# Patient Record
Sex: MALE | Race: WHITE | NOT HISPANIC OR LATINO | Employment: FULL TIME | URBAN - METROPOLITAN AREA
[De-identification: names, ages, dates, MRNs, and addresses within clinical notes are randomized per-mention and may not be internally consistent; named-entity substitution may affect disease eponyms.]

---

## 2017-02-23 ENCOUNTER — TELEPHONE (OUTPATIENT)
Dept: FAMILY MEDICINE | Facility: CLINIC | Age: 22
End: 2017-02-23

## 2017-02-23 NOTE — TELEPHONE ENCOUNTER
Reason for Call:  Other     Detailed comments: Patient would like to discuss if he is up to date on immunizations and where to go for a malaria pill.     Phone Number Patient can be reached at: Home number on file [phone] (home)    Best Time:     Can we leave a detailed message on this number?     Call taken on 2/23/2017 at 9:15 AM by Cari Albrecht

## 2017-02-23 NOTE — TELEPHONE ENCOUNTER
"Patient should make appt with travel clinic for this:    [facility]  [address]  [place] [address] [phone]      Attempted to call patient at home number, left message on voicemail identified as \"Rosario\" requesting patient call back to clinic to discuss.  Dorita Tinsley RN  [facility]    "

## 2017-02-24 NOTE — TELEPHONE ENCOUNTER
Walked up and relayed message to Rosario, mother.  She verbalized understanding.  Patient is currently in [place] and will seek out a travel clinic there.    Leonarda Marie RN  [facility]

## 2017-07-31 ENCOUNTER — OFFICE VISIT (OUTPATIENT)
Dept: URGENT CARE | Facility: URGENT CARE | Age: 22
End: 2017-07-31
Payer: COMMERCIAL

## 2017-07-31 VITALS
WEIGHT: 205.2 LBS | BODY MASS INDEX: 28.42 KG/M2 | HEART RATE: 85 BPM | OXYGEN SATURATION: 98 % | TEMPERATURE: 98 F | SYSTOLIC BLOOD PRESSURE: 142 MMHG | DIASTOLIC BLOOD PRESSURE: 89 MMHG

## 2017-07-31 DIAGNOSIS — J01.90 ACUTE SINUSITIS WITH COEXISTING CONDITION, NEED PROPHYLACTIC TREATMENT: Primary | ICD-10-CM

## 2017-07-31 DIAGNOSIS — J20.9 ACUTE BRONCHITIS WITH COEXISTING CONDITION REQUIRING PROPHYLACTIC TREATMENT: ICD-10-CM

## 2017-07-31 DIAGNOSIS — R07.0 THROAT PAIN: ICD-10-CM

## 2017-07-31 LAB
DEPRECATED S PYO AG THROAT QL EIA: NORMAL
MICRO REPORT STATUS: NORMAL
SPECIMEN SOURCE: NORMAL

## 2017-07-31 PROCEDURE — 87081 CULTURE SCREEN ONLY: CPT | Performed by: PHYSICIAN ASSISTANT

## 2017-07-31 PROCEDURE — 99213 OFFICE O/P EST LOW 20 MIN: CPT | Performed by: PHYSICIAN ASSISTANT

## 2017-07-31 PROCEDURE — 87880 STREP A ASSAY W/OPTIC: CPT | Performed by: PHYSICIAN ASSISTANT

## 2017-07-31 RX ORDER — AZITHROMYCIN 250 MG/1
TABLET, FILM COATED ORAL
Qty: 6 TABLET | Refills: 0 | Status: SHIPPED | OUTPATIENT
Start: 2017-07-31 | End: 2017-09-20

## 2017-07-31 NOTE — MR AVS SNAPSHOT
After Visit Summary   7/31/2017    Asher Lynn    MRN: [id number]           Patient Information     Date Of Birth          1995        Visit Information        Provider Department      7/31/2017 8:30 PM Rosario Rajan PA-C [facility]        Today's Diagnoses     Throat pain    -  1    Acute sinusitis with coexisting condition, need prophylactic treatment           Follow-ups after your visit        Your next 10 appointments already scheduled     Aug 02, 2017  5:00 PM CDT   Office Visit with Duane Foy MD   [facility]    [address] [place] [address] [phone]           Bring a current list of meds and any records pertaining to this visit. For Physicals, please bring immunization records and any forms needing to be filled out. Please arrive 10 minutes early to complete paperwork.              Who to contact     If you have questions or need follow up information about today's clinic visit or your schedule please contact [facility] directly at [phone].  Normal or non-critical lab and imaging results will be communicated to you by MyChart, letter or phone within 4 business days after the clinic has received the results. If you do not hear from us within 7 days, please contact the clinic through [employer] or phone. If you have a critical or abnormal lab result, we will notify you by phone as soon as possible.  Submit refill requests through [employer] or call your pharmacy and they will forward the refill request to us. Please allow 3 business days for your refill to be completed.          Additional Information About Your Visit        MyChart Information     [employer] gives you secure access to your electronic health record. If you see a primary care provider, you can also send messages to your care team and make appointments. If you have questions,  please call your primary care clinic.  If you do not have a primary care provider, please call [phone] and they will assist you.        Care EveryWhere ID     This is your Care EveryWhere ID. This could be used by other organizations to access your [facility] medical records  [id number]        Your Vitals Were     Pulse Temperature Pulse Oximetry BMI (Body Mass Index)          85 98  F (36.7  C) (Oral) 98% 28.42 kg/m2         Blood Pressure from Last 3 Encounters:   07/31/17 142/89   03/03/16 113/72   04/22/15 115/67    Weight from Last 3 Encounters:   07/31/17 205 lb 3.2 oz (93.1 kg)   03/03/16 192 lb (87.1 kg)   04/22/15 188 lb (85.3 kg)              We Performed the Following     Beta strep group A culture     Strep, Rapid Screen          Today's Medication Changes          These changes are accurate as of: 7/31/17  8:57 PM.  If you have any questions, ask your nurse or doctor.               Start taking these medicines.        Dose/Directions    azithromycin 250 MG tablet   Commonly known as:  ZITHROMAX Z-EILEEN   Used for:  Throat pain, Acute sinusitis with coexisting condition, need prophylactic treatment   Started by:  Rosario Rajan PA-C        2 tabs day one then 1 tab qd   Quantity:  6 tablet   Refills:  0            Where to get your medicines      These medications were sent to [id number] - [place] - [address] AT [address]  [address] [place] [address]     Phone:  [phone]     azithromycin 250 MG tablet                Primary Care Provider Office Phone # Fax #    Duane Foy [phone]       [facility] [address] [address]        Equal Access to Services     NICOLE CARMONA : Osvaldo Salazar, wakellie metzger, qaybta kaekaterina wesley, villa koroma. So [facility] [phone].    ATENCIÓN: Si deannala español, tiene a feng disposición  servicios gratuitos de asistencia lingüística. Socorro crystal [phone].    We comply with applicable federal civil rights laws and [place] laws. We do not discriminate on the basis of race, color, national origin, age, disability sex, sexual orientation or gender identity.            Thank you!     Thank you for choosing [facility]  for your care. Our goal is always to provide you with excellent care. Hearing back from our patients is one way we can continue to improve our services. Please take a few minutes to complete the written survey that you may receive in the mail after your visit with us. Thank you!             Your Updated Medication List - Protect others around you: Learn how to safely use, store and throw away your medicines at www.disposemymeds.org.          This list is accurate as of: 7/31/17  8:57 PM.  Always use your most recent med list.                   Brand Name Dispense Instructions for use Diagnosis    azithromycin 250 MG tablet    ZITHROMAX Z-EILEEN    6 tablet    2 tabs day one then 1 tab qd    Throat pain, Acute sinusitis with coexisting condition, need prophylactic treatment       IBUPROFEN PO      Take 1 tablet by mouth daily.

## 2017-08-01 LAB
BACTERIA SPEC CULT: NORMAL
MICRO REPORT STATUS: NORMAL
SPECIMEN SOURCE: NORMAL

## 2017-08-01 NOTE — PROGRESS NOTES
SUBJECTIVE:                                                    Asher Lynn is a 21 year old male who presents to clinic today for the following health issues:      RESPIRATORY SYMPTOMS      Duration: x1 week    Description  nasal congestion, sore throat, facial pain/pressure, cough, fever and headache    Severity: severe    Accompanying signs and symptoms: None    History (predisposing factors):  none    Precipitating or alleviating factors: None    Therapies tried and outcome:  rest and fluids oral decongestant acetaminophen ibuprofen    H/o asthma. Works in a     Allergies   Allergen Reactions     Morphine Hives       Past Medical History:   Diagnosis Date     Acne      Nephrolithiasis 12/15    Calcium oxalate stones         Current Outpatient Prescriptions on File Prior to Visit:  IBUPROFEN PO Take 1 tablet by mouth daily.     No current facility-administered medications on file prior to visit.     Social History   Substance Use Topics     Smoking status: Never Smoker     Smokeless tobacco: Never Used     Alcohol use 0.0 oz/week     0 Standard drinks or equivalent per week       ROS:  Consitutional: As above  ENT: As above  Respiratory: As above    OBJECTIVE:  /89  Pulse 85  Temp 98  F (36.7  C) (Oral)  Wt 205 lb 3.2 oz (93.1 kg)  SpO2 98%  BMI 28.42 kg/m2  GENERAL APPEARANCE: healthy, alert and no distress  EYES: conjunctiva clear  EARS: No cerumen.   Ear canals w/o erythema, TM's intact w/o erythema.    NOSE/MOUTH: Nose and mouth without ulcers, erythema or lesions  SINUSES: Moderate maxillary sinus tenderness.  THROAT: Moderate erythema w/o tonsillar enlargement . No exudates  NECK: supple, nontender, no lymphadenopathy  RESP: lungs clear to auscultation - no rales, rhonchi or wheezes  CV: regular rates and rhythm, normal S1 S2, no murmur noted  NEURO: awake, alert      Results for orders placed or performed in visit on 07/31/17   Strep, Rapid Screen   Result Value Ref Range     Specimen Description Throat     Rapid Strep A Screen       NEGATIVE: No Group A streptococcal antigen detected by immunoassay, await   culture report.      Micro Report Status FINAL 07/31/2017          ASSESSMENT: Well appear    ICD-10-CM    1. Acute sinusitis with coexisting condition, need prophylactic treatment J01.90 azithromycin (ZITHROMAX Z-EILEEN) 250 MG tablet   2. Acute bronchitis with coexisting condition requiring prophylactic treatment J20.9    3. Throat pain R07.0 Strep, Rapid Screen     Beta strep group A culture     azithromycin (ZITHROMAX Z-EILEEN) 250 MG tablet       PLAN:  Lots of rest and fluids.  RTC if any worsening symptoms or if not improving.    Rosario Rajan PA-C

## 2017-08-02 ENCOUNTER — OFFICE VISIT (OUTPATIENT)
Dept: FAMILY MEDICINE | Facility: CLINIC | Age: 22
End: 2017-08-02
Payer: COMMERCIAL

## 2017-08-02 VITALS
HEART RATE: 72 BPM | SYSTOLIC BLOOD PRESSURE: 131 MMHG | DIASTOLIC BLOOD PRESSURE: 79 MMHG | OXYGEN SATURATION: 98 % | TEMPERATURE: 98.3 F | HEIGHT: 71 IN | WEIGHT: 211 LBS | BODY MASS INDEX: 29.54 KG/M2

## 2017-08-02 DIAGNOSIS — M75.41 ROTATOR CUFF IMPINGEMENT SYNDROME OF RIGHT SHOULDER: Primary | ICD-10-CM

## 2017-08-02 DIAGNOSIS — Z13.1 SCREENING FOR DIABETES MELLITUS: ICD-10-CM

## 2017-08-02 DIAGNOSIS — Z11.3 SCREENING EXAMINATION FOR VENEREAL DISEASE: ICD-10-CM

## 2017-08-02 LAB — HBA1C MFR BLD: 5 % (ref 4.3–6)

## 2017-08-02 PROCEDURE — 87591 N.GONORRHOEAE DNA AMP PROB: CPT | Performed by: FAMILY MEDICINE

## 2017-08-02 PROCEDURE — 87389 HIV-1 AG W/HIV-1&-2 AB AG IA: CPT | Performed by: FAMILY MEDICINE

## 2017-08-02 PROCEDURE — 36415 COLL VENOUS BLD VENIPUNCTURE: CPT | Performed by: FAMILY MEDICINE

## 2017-08-02 PROCEDURE — 87491 CHLMYD TRACH DNA AMP PROBE: CPT | Performed by: FAMILY MEDICINE

## 2017-08-02 PROCEDURE — 83036 HEMOGLOBIN GLYCOSYLATED A1C: CPT | Performed by: FAMILY MEDICINE

## 2017-08-02 PROCEDURE — 82947 ASSAY GLUCOSE BLOOD QUANT: CPT | Performed by: FAMILY MEDICINE

## 2017-08-02 PROCEDURE — 99214 OFFICE O/P EST MOD 30 MIN: CPT | Performed by: FAMILY MEDICINE

## 2017-08-02 PROCEDURE — 86780 TREPONEMA PALLIDUM: CPT | Performed by: FAMILY MEDICINE

## 2017-08-02 NOTE — NURSING NOTE
"Chief Complaint   Patient presents with     Personal     STD testing     Imm/Inj     Cortisone injection rigth shoulder       Initial /79 (BP Location: Right arm, Patient Position: Chair, Cuff Size: Adult Large)  Pulse 72  Temp 98.3  F (36.8  C) (Oral)  Ht 5' 11\" (1.803 m)  Wt 211 lb (95.7 kg)  SpO2 98%  BMI 29.43 kg/m2 Estimated body mass index is 29.43 kg/(m^2) as calculated from the following:    Height as of this encounter: 5' 11\" (1.803 m).    Weight as of this encounter: 211 lb (95.7 kg).  Medication Reconciliation: complete   Katelin Islas CMA       "

## 2017-08-02 NOTE — PROGRESS NOTES
SUBJECTIVE:                                                    Asher Lynn is a 21 year old male who presents to clinic today for the following health issues:      Patient is here for STD testing, possible cortisone injection in right shoulder. He would also like testing for diabetes.    He broke up with his girlfriend a while ago. He previously had unprotected sex with her. He wants to make sure that he doesn't have any STDs. He doesn't have any STD symptoms such as penile lesions, penile discharge, etc.    He has a family history of diabetes. He would like to be checked for that. No excessive thirst or excessive urination or unexplained weight loss.    He's had issues with right shoulder impingement in the past. He does have some mild right shoulder discomfort currently. It is not very bothersome, however. He has been playing football in college and has continued since graduation. 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  He has had a few cortisone injections in the past for his shoulder which he has found helpful.  He is wondering if he should have another one.    Problem list and histories reviewed & adjusted, as indicated.  Additional history: as documented    Patient Active Problem List   Diagnosis     Shoulder impingement - right     Glenoid labrum tear - right     Nephrolithiasis     Splenic cyst     Past Surgical History:   Procedure Laterality Date     ENDOSCOPY  3-20-15     IR URETERAL STENT PLACEMENT LEFT  2/29/16    at [facility]     ORIF left hand fracture  3/19/2010    4th metacarpal fracture     repair of right great toe torn ligament Right 1/10/14    Dr. James     rerpair of left sternoclavicular dislocation  08/2016     SURGICAL HISTORY OF -   5/26/2010    removal of hardware from 4th metacarpal fx       Social History   Substance Use Topics     Smoking status: Never Smoker     Smokeless tobacco: Never Used  "    Alcohol use 0.0 oz/week     0 Standard drinks or equivalent per week     Family History   Problem Relation Age of Onset     Hypertension Mother      Glaucoma No family hx of      Macular Degeneration No family hx of      CANCER No family hx of      CEREBROVASCULAR DISEASE No family hx of      Thyroid Disease No family hx of          Current Outpatient Prescriptions   Medication Sig Dispense Refill     azithromycin (ZITHROMAX Z-EILEEN) 250 MG tablet 2 tabs day one then 1 tab qd 6 tablet 0     IBUPROFEN PO Take 1 tablet by mouth daily.       Allergies   Allergen Reactions     Latex Anaphylaxis and Hives     With a balloon     Morphine Hives         Reviewed and updated as needed this visit by clinical staffTobacco  Allergies  Med Hx  Surg Hx  Fam Hx  Soc Hx      Reviewed and updated as needed this visit by Provider         ROS:  Noncontributory except as above    OBJECTIVE:     /79 (BP Location: Right arm, Patient Position: Chair, Cuff Size: Adult Large)  Pulse 72  Temp 98.3  F (36.8  C) (Oral)  Ht 5' 11\" (1.803 m)  Wt 211 lb (95.7 kg)  SpO2 98%  BMI 29.43 kg/m2  Body mass index is 29.43 kg/(m^2).  GENERAL: healthy, alert and no distress  MS: He has mild impingement symptoms in the right shoulder including some discomfort in the shoulder when he abducts his right arm up overhead and when he reaches his right arm behind his back. He has a mildly positive supraspinatus empty can test.    Diagnostic Test Results:  none     ASSESSMENT/PLAN:       ICD-10-CM    1. Rotator cuff impingement syndrome of right shoulder M75.41    2. Screening examination for venereal disease Z11.3 NEISSERIA GONORRHOEA PCR     CHLAMYDIA TRACHOMATIS PCR     HIV Antigen Antibody Combo     Anti Treponema   3. Screening for diabetes mellitus Z13.1 Glucose     Hemoglobin A1c     He appears to have some mild right rotator cuff impingement syndrome with rotator cuff tendinitis  We discussed the option of doing a cortisone shot versus " trying to do some rehabilitation  He does have some football games coming up and we elected to have him do rehabilitation exercises for now and wait until this semi-pro-season is done in the next month or so  If he is still symptomatic then, we would probably go ahead and do a cortisone injection  He will follow-up with us as needed/desired    We will do STD and diabetes screening as above (note he is not fasting today) and inform him of those results when available    Duane Foy MD  [facility]

## 2017-08-02 NOTE — MR AVS SNAPSHOT
"              After Visit Summary   8/2/2017    Asher Lynn    MRN: [id number]           Patient Information     Date Of Birth          1995        Visit Information        Provider Department      8/2/2017 5:00 PM Duane Foy MD [facility]        Today's Diagnoses     Rotator cuff impingement syndrome of right shoulder    -  1    Screening examination for venereal disease        Screening for diabetes mellitus           Follow-ups after your visit        Who to contact     If you have questions or need follow up information about today's clinic visit or your schedule please contact [facility] directly at [phone].  Normal or non-critical lab and imaging results will be communicated to you by MyChart, letter or phone within 4 business days after the clinic has received the results. If you do not hear from us within 7 days, please contact the clinic through [employer] or phone. If you have a critical or abnormal lab result, we will notify you by phone as soon as possible.  Submit refill requests through [employer] or call your pharmacy and they will forward the refill request to us. Please allow 3 business days for your refill to be completed.          Additional Information About Your Visit        MyChart Information     [employer] gives you secure access to your electronic health record. If you see a primary care provider, you can also send messages to your care team and make appointments. If you have questions, please call your primary care clinic.  If you do not have a primary care provider, please call [phone] and they will assist you.        Care EveryWhere ID     This is your Care EveryWhere ID. This could be used by other organizations to access your [facility] medical records  [id number]        Your Vitals Were     Pulse Temperature Height Pulse Oximetry BMI (Body Mass Index)       72 98.3  F (36.8  C) (Oral) 5' 11\" (1.803 m) 98% 29.43 kg/m2  "       Blood Pressure from Last 3 Encounters:   08/02/17 131/79   07/31/17 142/89   03/03/16 113/72    Weight from Last 3 Encounters:   08/02/17 211 lb (95.7 kg)   07/31/17 205 lb 3.2 oz (93.1 kg)   03/03/16 192 lb (87.1 kg)              We Performed the Following     Anti Treponema     CHLAMYDIA TRACHOMATIS PCR     Glucose     Hemoglobin A1c     HIV Antigen Antibody Combo     NEISSERIA GONORRHOEA PCR        Primary Care Provider Office Phone # Fax #    Duane Foy [phone]       [facility] [address]  [facility] [place] [address]        Equal Access to Services     NICOLE CARMONA : Hadii billy alexandero Soliam, waaxda luqadaha, qaybta kaalmada adekirstyyada, villa mata . So [facility] [phone].    ATENCIÓN: Si habla español, tiene a feng disposición servicios gratuitos de asistencia lingüística. Llame al [phone].    We comply with applicable federal civil rights laws and [place] laws. We do not discriminate on the basis of race, color, national origin, age, disability sex, sexual orientation or gender identity.            Thank you!     Thank you for choosing [facility]  for your care. Our goal is always to provide you with excellent care. Hearing back from our patients is one way we can continue to improve our services. Please take a few minutes to complete the written survey that you may receive in the mail after your visit with us. Thank you!             Your Updated Medication List - Protect others around you: Learn how to safely use, store and throw away your medicines at www.disposemymeds.org.          This list is accurate as of: 8/2/17  5:31 PM.  Always use your most recent med list.                   Brand Name Dispense Instructions for use Diagnosis    azithromycin 250 MG tablet    ZITHROMAX Z-EILEEN    6 tablet    2 tabs day one then 1 tab qd    Throat pain, Acute sinusitis with coexisting condition, need  prophylactic treatment       IBUPROFEN PO      Take 1 tablet by mouth daily.

## 2017-08-03 LAB
GLUCOSE SERPL-MCNC: 96 MG/DL (ref 70–99)
HIV 1+2 AB+HIV1 P24 AG SERPL QL IA: NORMAL

## 2017-08-04 LAB
C TRACH DNA SPEC QL NAA+PROBE: NORMAL
N GONORRHOEA DNA SPEC QL NAA+PROBE: NORMAL
SPECIMEN SOURCE: NORMAL
SPECIMEN SOURCE: NORMAL
T PALLIDUM IGG+IGM SER QL: NEGATIVE

## 2017-08-04 NOTE — PROGRESS NOTES
Bennett,  Your lab work is all nice and normal including STD tests for chlamydia, gonorrhea, HIV, and syphilis. Diabetes testing is normal (negative) as well.    Duane Foy MD

## 2017-08-26 ENCOUNTER — APPOINTMENT (OUTPATIENT)
Dept: GENERAL RADIOLOGY | Facility: CLINIC | Age: 22
End: 2017-08-26
Attending: NURSE PRACTITIONER
Payer: COMMERCIAL

## 2017-08-26 ENCOUNTER — HOSPITAL ENCOUNTER (EMERGENCY)
Facility: CLINIC | Age: 22
Discharge: HOME | End: 2017-08-26
Attending: NURSE PRACTITIONER | Admitting: NURSE PRACTITIONER
Payer: COMMERCIAL

## 2017-08-26 VITALS
SYSTOLIC BLOOD PRESSURE: 140 MMHG | OXYGEN SATURATION: 100 % | HEIGHT: 72 IN | TEMPERATURE: 99 F | DIASTOLIC BLOOD PRESSURE: 87 MMHG | HEART RATE: 75 BPM | WEIGHT: 205 LBS | RESPIRATION RATE: 18 BRPM | BODY MASS INDEX: 27.77 KG/M2

## 2017-08-26 DIAGNOSIS — M25.511 RIGHT SHOULDER PAIN, UNSPECIFIED CHRONICITY: ICD-10-CM

## 2017-08-26 PROCEDURE — 29105 APPLICATION LONG ARM SPLINT: CPT | Mod: RT

## 2017-08-26 PROCEDURE — 25000132 ZZH RX MED GY IP 250 OP 250 PS 637: Performed by: NURSE PRACTITIONER

## 2017-08-26 PROCEDURE — 73000 X-RAY EXAM OF COLLAR BONE: CPT | Mod: LT

## 2017-08-26 PROCEDURE — 99284 EMERGENCY DEPT VISIT MOD MDM: CPT | Mod: 25

## 2017-08-26 PROCEDURE — 73030 X-RAY EXAM OF SHOULDER: CPT | Mod: RT

## 2017-08-26 RX ORDER — ACETAMINOPHEN 500 MG
1000 TABLET ORAL ONCE
Status: COMPLETED | OUTPATIENT
Start: 2017-08-26 | End: 2017-08-26

## 2017-08-26 RX ORDER — HYDROCODONE BITARTRATE AND ACETAMINOPHEN 5; 325 MG/1; MG/1
1-2 TABLET ORAL EVERY 4 HOURS PRN
Qty: 15 TABLET | Refills: 0 | Status: SHIPPED | OUTPATIENT
Start: 2017-08-26 | End: 2017-09-20

## 2017-08-26 RX ORDER — IBUPROFEN 200 MG
600 TABLET ORAL EVERY 4 HOURS PRN
Qty: 60 TABLET | Refills: 0 | Status: SHIPPED | OUTPATIENT
Start: 2017-08-26 | End: 2017-08-26 | Stop reason: DRUGHIGH

## 2017-08-26 RX ORDER — IBUPROFEN 200 MG
600 TABLET ORAL EVERY 6 HOURS PRN
Qty: 60 TABLET | Refills: 0 | Status: SHIPPED | OUTPATIENT
Start: 2017-08-26 | End: 2021-09-15

## 2017-08-26 RX ADMIN — ACETAMINOPHEN 1000 MG: 500 TABLET, FILM COATED ORAL at 20:52

## 2017-08-26 ASSESSMENT — ENCOUNTER SYMPTOMS
ARTHRALGIAS: 1
NECK PAIN: 1
WEAKNESS: 0
NUMBNESS: 0

## 2017-08-26 NOTE — ED AVS SNAPSHOT
Emergency Department    [address] [place] [address]    Phone:  [phone]    Fax:  [phone]                                       Asher Lynn   MRN: [id number]    Department:   Emergency Department   Date of Visit:  8/26/2017           After Visit Summary Signature Page     I have received my discharge instructions, and my questions have been answered. I have discussed any challenges I see with this plan with the nurse or doctor.    ..........................................................................................................................................  Patient/Patient Representative Signature      ..........................................................................................................................................  Patient Representative Print Name and Relationship to Patient    ..................................................               ................................................  Date                                            Time    ..........................................................................................................................................  Reviewed by Signature/Title    ...................................................              ..............................................  Date                                                            Time

## 2017-08-26 NOTE — ED AVS SNAPSHOT
Emergency Department    [address] [facility] [address]    Phone:  [phone]    Fax:  [phone]                                       Asher Lynn   MRN: [id number]    Department:   Emergency Department   Date of Visit:  8/26/2017           Patient Information     Date Of Birth          1995        Your diagnoses for this visit were:     Right shoulder pain, unspecified chronicity possible self-reduced dislocation       You were seen by Xiomara Boone, CNP.      Follow-up Information     Follow up with Sam Mckeon MD In 2 days.    Specialty:  Orthopedics    Contact information:    [facility]  [address] [facility] [address] [phone]          Follow up with  Emergency Department.    Specialty:  EMERGENCY MEDICINE    Why:  As needed, If symptoms worsen    Contact information:    [address] [facility] [id number] [phone]      Discharge References/Attachments     DISLOCATION: SHOULDER (REDUCED) (ENGLISH)    SHOULDER INSTABILITY, UNDERSTANDING (ENGLISH)    SHOULDER IMMOBILIZER (ENGLISH)      24 Hour Appointment Hotline       To make an appointment at any [facility], call [phone] ([phone]). If you don't have a family doctor or clinic, we will help you find one. [facility] clinics are conveniently located to serve the needs of you and your family.             Review of your medicines      START taking        Dose / Directions Last dose taken    HYDROcodone-acetaminophen 5-325 MG per tablet   Commonly known as:  NORCO   Dose:  1-2 tablet   Quantity:  15 tablet        Take 1-2 tablets by mouth every 4 hours as needed for moderate to severe pain   Refills:  0          CONTINUE these medicines which may have CHANGED, or have new prescriptions. If we are uncertain of the size of tablets/capsules you have at home, strength may be listed as something that might have changed.        Dose / Directions Last dose taken    *  ibuprofen 200 MG tablet   Commonly known as:  ADVIL/MOTRIN   Dose:  600 mg   What changed:  You were already taking a medication with the same name, and this prescription was added. Make sure you understand how and when to take each.   Quantity:  60 tablet        Take 3 tablets (600 mg) by mouth every 6 hours as needed for mild pain   Refills:  0        * IBUPROFEN PO   Dose:  1 tablet   What changed:  Another medication with the same name was added. Make sure you understand how and when to take each.        Take 1 tablet by mouth daily.   Refills:  0        * Notice:  This list has 2 medication(s) that are the same as other medications prescribed for you. Read the directions carefully, and ask your doctor or other care provider to review them with you.      Our records show that you are taking the medicines listed below. If these are incorrect, please call your family doctor or clinic.        Dose / Directions Last dose taken    azithromycin 250 MG tablet   Commonly known as:  ZITHROMAX Z-EILEEN   Quantity:  6 tablet        2 tabs day one then 1 tab qd   Refills:  0                Prescriptions were sent or printed at these locations (2 Prescriptions)                   Other Prescriptions                Printed at Department/Unit printer (2 of 2)         HYDROcodone-acetaminophen (NORCO) 5-325 MG per tablet               ibuprofen (ADVIL/MOTRIN) 200 MG tablet                Procedures and tests performed during your visit     Clavicle XR, left    Shoulder XR, G/E 3 views, right      Orders Needing Specimen Collection     None      Pending Results     No orders found from 8/24/2017 to 8/27/2017.            Pending Culture Results     No orders found from 8/24/2017 to 8/27/2017.            Pending Results Instructions     If you had any lab results that were not finalized at the time of your Discharge, you can call the ED Lab Result RN at [phone]. You will be contacted by this team for any positive Lab results or  changes in treatment. The nurses are available 7 days a week from 10A to 6:30P.  You can leave a message 24 hours per day and they will return your call.        Test Results From Your Hospital Stay        8/26/2017  9:12 PM      Narrative     RIGHT SHOULDER THREE OR MORE VIEWS   8/26/2017 8:50 PM     HISTORY: Possible dislocation at 1500.    COMPARISON: 2/4/2013.        Impression     IMPRESSION: Normal.    LEANN KRAMER MD         8/26/2017  9:12 PM      Narrative     LEFT CLAVICLE TWO VIEWS   8/26/2017 8:49 PM     HISTORY: History of sternoclavicular repair and pain at the site.    COMPARISON: None.        Impression     IMPRESSION: Normal two-view clavicle exam.    LEANN KRAMER MD                Clinical Quality Measure: Blood Pressure Screening     Your blood pressure was checked while you were in the emergency department today. The last reading we obtained was  BP: 153/84 . Please read the guidelines below about what these numbers mean and what you should do about them.  If your systolic blood pressure (the top number) is less than 120 and your diastolic blood pressure (the bottom number) is less than 80, then your blood pressure is normal. There is nothing more that you need to do about it.  If your systolic blood pressure (the top number) is 120-139 or your diastolic blood pressure (the bottom number) is 80-89, your blood pressure may be higher than it should be. You should have your blood pressure rechecked within a year by a primary care provider.  If your systolic blood pressure (the top number) is 140 or greater or your diastolic blood pressure (the bottom number) is 90 or greater, you may have high blood pressure. High blood pressure is treatable, but if left untreated over time it can put you at risk for heart attack, stroke, or kidney failure. You should have your blood pressure rechecked by a primary care provider within the next 4 weeks.  If your provider in the emergency department today gave you  specific instructions to follow-up with your doctor or provider even sooner than that, you should follow that instruction and not wait for up to 4 weeks for your follow-up visit.        Thank you for choosing [facility]       Thank you for choosing [facility] for your care. Our goal is always to provide you with excellent care. Hearing back from our patients is one way we can continue to improve our services. Please take a few minutes to complete the written survey that you may receive in the mail after you visit with us. Thank you!        [facility] Information     [facility] gives you secure access to your electronic health record. If you see a primary care provider, you can also send messages to your care team and make appointments. If you have questions, please call your primary care clinic.  If you do not have a primary care provider, please call [phone] and they will assist you.        Care EveryWhere ID     This is your Care EveryWhere ID. This could be used by other organizations to access your [facility] medical records  [id number]        Equal Access to Services     NICOLE CARMONA AH: Hadii billy Salazar, toña metzger, diamante wesley, [place]. So [facility] [phone].    ATENCIÓN: Si habla español, tiene a feng disposición servicios gratuitos de asistencia lingüística. Llame al [phone].    We comply with applicable federal civil rights laws and [place] laws. We do not discriminate on the basis of race, color, national origin, age, disability sex, sexual orientation or gender identity.            After Visit Summary       This is your record. Keep this with you and show to your community pharmacist(s) and doctor(s) at your next visit.

## 2017-08-27 NOTE — ED PROVIDER NOTES
History     Chief Complaint:  Shoulder Injury     The history is provided by the patient.      Asher Lynn is a 21 year old male who presents with a shoulder injury. Patient was playing football at 1730 this evening when his felt as if his right shoulder popped out of the socket and then back in when being tackled and hitting the ground underneath the other player. He also felt there was some crunching noises coming from left clavicle which he had surgically repaired after dislocation last year. Trainer for the team believed it was an anterior dislocation and wanted patient to get cleared because he has a history of glenoid labrum tear in the same shoulder prompting visit to the emergency department. Currently patient rates pain at 8/10 in severity described as a throbbing pain with a sharp pain on top of the shoulder. He did take 4x Advil at 1730 when the injury occurred.  The patient notes this is the third time that shoulder has dislocated and past MRI demonstrated the glenoid labrum tear. Patient also reports concussion last week with neck pain since that time which he has been previously evaluated for. He denies pain to the elbow or wrist, back pain, numbness/tingling or weakness in extremity, or other complaint.     Allergies:  Latex  Morphine     Medications:    The patient is not currently taking any prescribed medications.     Past Medical History:    Acne  Nephrolithiasis   Splenic cyst  Shoulder impingement, right  Glenoid labrum tear, right    Past Surgical History:    Endoscopy  Ureteral stent placement, left  ORIF 4th metacarpal fracture  Repair right great toe torn ligament, right  Repair left sternoclavicular dislocation  Removal of hardware from 4th metacarpal fracture    Family History:    HTN    Social History:  Presents alone   Tobacco use: Never  Alcohol use: Negative  PCP: Duane Foy    Marital Status:  Single     Review of Systems   Musculoskeletal: Positive for arthralgias  and neck pain.   Neurological: Negative for weakness and numbness.   All other systems reviewed and are negative.    Physical Exam     Patient Vitals for the past 24 hrs:   BP Temp Temp src Heart Rate Resp SpO2 Height Weight   08/26/17 2024 153/84 99  F (37.2  C) Oral 77 18 96 % 1.829 m (6') 93 kg (205 lb)      Physical Exam  Nursing notes reviewed. Vitals reviewed.  General: Alert. Well kept.  Eyes: Conjunctiva non-injected, non-icteric.  Neck/Throat: Moist mucous membranes. Normal voice. No pain to palpation of cervical spine.   Cardiac: Regular rhythm. Normal heart sounds. 2+ radial and ulnar pulses on right UE with <2 second capillary refill.  Pulmonary: Clear and equal breath sounds bilaterally.   Musculoskeletal: Mild tenderness over left sternoclavicular joint.  Right upper extremity: Tenderness to palpation over the anterior shoulder with mild tenderness over the AC joint. No pain over humerus, clavicle, or scapula.  External rotation to 75 degrees with internal rotation to the belt line. Able to abduct shoulder to 90 degrees. No elbow or wrist tenderness and full range of motion at elbow and wrist.   Neurological: Alert and oriented x4. Distal sensation intact in radial, median and ulnar nerve distributions of right upper extremity.   Skin: No laceration or ecchymosis to affected extremity.   Psych: Affect normal. Good eye contact.     Emergency Department Course   Imaging:  Radiographic findings were communicated with the patient who voiced understanding of the findings.    Clavicle XR, left:  IMPRESSION: Normal two-view clavicle exam.    Shoulder XR, 3 views, right:  IMPRESSION: Normal.    Imaging independently reviewed and agree with radiologist interpretation.      Interventions:  2052: Tylenol 1000 mg PO      Emergency Department Course:  Past medical records, nursing notes, and vitals reviewed.  2035: I performed an exam of the patient and obtained history, as documented above.   The patient was sent  for a X-rays while in the emergency department, findings above.   Patient placed in shoulder immobilizer.   2129: I rechecked the patient.  Findings and plan explained to the Patient. Patient discharged home with instructions regarding supportive care, medications, and reasons to return. The importance of close follow-up was reviewed.      Impression & Plan    Medical Decision Making:  Asher Lynn is a 21 year old male who presents with right shoulder pain after possible dislocation with spontaneous reduction in a football game this evening. Differential diagnosis includes clavicular or scapular fracture, AC joint or sternoclavicular joint pathology, rotator cuff injury, shoulder dislocation, humoral fracture, among others. There is no sign of vascular or neurologic compromise but patient's range of motion is limited secondary to pain. X-rays were obtained and negative for fracture or current dislocation. There are no other findings concerning for the above listed differential. Given the negative imaging, patient placed in shoulder immobilizer and provided instructions for use of anti-inflammatories and pain medication until follow up with orthopedics as indicated for evaluation and possible further imaging/MRI. No indications for emergent referral from the ED.  Return precautions discussed.     Diagnosis:    ICD-10-CM    1. Right shoulder pain, unspecified chronicity M25.511     possible self-reduced dislocation     Disposition:  Discharged to home with plan as outlined.  Discharge Medications:  New Prescriptions    HYDROCODONE-ACETAMINOPHEN (NORCO) 5-325 MG PER TABLET    Take 1-2 tablets by mouth every 4 hours as needed for moderate to severe pain    IBUPROFEN (ADVIL/MOTRIN) 200 MG TABLET    Take 3 tablets (600 mg) by mouth every 6 hours as needed for mild pain         EMERGENCY DEPARTMENT  I, Heber Zacarias am serving as a scribe at 8:35 PM on 8/26/2017 to document services personally performed by  Makaweli, Xiomara, CNP based on my observations and the provider's statements to me.       Xiomara Boone CNP  08/26/17 3912

## 2017-08-30 ENCOUNTER — TELEPHONE (OUTPATIENT)
Dept: FAMILY MEDICINE | Facility: CLINIC | Age: 22
End: 2017-08-30

## 2017-08-30 DIAGNOSIS — M25.9 SHOULDER PROBLEM: Primary | ICD-10-CM

## 2017-08-30 NOTE — TELEPHONE ENCOUNTER
Reason for Call:  Other MRI     Detailed comments: Had a MRI done on his shoulder and would like to speak with him about the results.     Phone Number Patient can be reached at: Home number on file [phone] (home)    Best Time: Anytime     Can we leave a detailed message on this number? YES    Call taken on 8/30/2017 at 6:43 PM by Shawnee Michelle

## 2017-08-31 NOTE — TELEPHONE ENCOUNTER
I returned Bennett's call. He had an MRI of his shoulder this week after having a football injury this last weekend. The MRI was ordered by orthopedist Dr. Sam Mckeon (sp?) and was done at [facility]. Bennett was hoping I would have access to the results to discuss results with him, but I do not. I suggested he call the orthopedic doctor's office who ordered the test. He will also talk to his . I would be happy to dialogue further about the results as well when they become available.

## 2017-09-03 ENCOUNTER — OFFICE VISIT (OUTPATIENT)
Dept: URGENT CARE | Facility: URGENT CARE | Age: 22
End: 2017-09-03
Payer: COMMERCIAL

## 2017-09-03 VITALS
BODY MASS INDEX: 28.73 KG/M2 | SYSTOLIC BLOOD PRESSURE: 123 MMHG | WEIGHT: 211.8 LBS | DIASTOLIC BLOOD PRESSURE: 80 MMHG | HEART RATE: 64 BPM | OXYGEN SATURATION: 98 % | TEMPERATURE: 98.7 F

## 2017-09-03 DIAGNOSIS — M25.511 ACUTE PAIN OF RIGHT SHOULDER: Primary | ICD-10-CM

## 2017-09-03 PROCEDURE — 99213 OFFICE O/P EST LOW 20 MIN: CPT | Performed by: PHYSICIAN ASSISTANT

## 2017-09-03 RX ORDER — KETOROLAC TROMETHAMINE 30 MG/ML
60 INJECTION, SOLUTION INTRAMUSCULAR; INTRAVENOUS ONCE
Qty: 2 ML | Refills: 0 | OUTPATIENT
Start: 2017-09-03 | End: 2017-09-03

## 2017-09-03 RX ORDER — PREDNISONE 20 MG/1
TABLET ORAL
Qty: 15 TABLET | Refills: 0 | Status: SHIPPED | OUTPATIENT
Start: 2017-09-03 | End: 2017-09-20

## 2017-09-03 RX ORDER — INDOMETHACIN 50 MG/1
50 CAPSULE ORAL
Qty: 30 CAPSULE | Refills: 0 | Status: SHIPPED | OUTPATIENT
Start: 2017-09-03 | End: 2017-09-20

## 2017-09-03 ASSESSMENT — PAIN SCALES - GENERAL: PAINLEVEL: EXTREME PAIN (8)

## 2017-09-03 NOTE — MR AVS SNAPSHOT
After Visit Summary   9/3/2017    Asher Lynn    MRN: [id number]           Patient Information     Date Of Birth          1995        Visit Information        Provider Department      9/3/2017 1:45 PM Devang Guerin PA [facility]        Today's Diagnoses     Acute pain of right shoulder    -  1      Care Instructions    Based on your medical history and these are the current health maintenance or preventive care services that you are due for (some may have been done at this visit)  Health Maintenance Due   Topic Date Due     EYE EXAM Q1 YEAR  11/11/2016     INFLUENZA VACCINE (SYSTEM ASSIGNED)  09/01/2017         At [facility], we strive to deliver an exceptional experience to you, every time we see you.    If you receive a survey in the mail, please send us back your thoughts. We really do value your feedback.    Your care team's suggested websites for health information:  Www.MeilleurMobile : Up to date and easily searchable information on multiple topics.  Www.medlineplus.gov : medication info, interactive tutorials, watch real surgeries online  Www.familydoctor.org : good info from the Academy of Family Physicians  Www.cdc.gov : public health info, travel advisories, epidemics (H1N1)  Www.aap.org : children's health info, normal development, vaccinations  Www.health.[facility].[place].us : [place] dept of health, public health issues in [place], N1N1    How to contact your care team:   Team Lea/Shon [phone]         Pharmacy [phone]    Dr. Edgar, Kimberly Wise PA-C, Roselyn Huber CNP, Mariama Dubose PA-C, Dr. Guillen, and THEE Navarro CNP    Team RNs: Ysabel & Ana      Clinic hours  M-Th 7 am-7 pm   Fri 7 am-5 pm.   Urgent care M-F 11 am-9 pm,   Sat/Sun 9 am-5 pm.  Pharmacy M-Th 8 am-8 pm Fri 8 am-6 pm  Sat/Sun 9 am-5 pm.     All password changes, disabled accounts, or ID changes in [email or web address]  will be done by our Access Services Department.    If you need help with your account or password, call: [phone]. Clinic staff no longer has the ability to change passwords.       Shoulder Pain (Uncertain Cause)  Shoulder pain often arises from the structures that surround the shoulder joint (the joint capsule, ligaments, tendons, muscles, and bursa). The joint itself contains cartilage that can become worn out or injured and can also be a source of pain. The correct treatment requires knowing the cause of the pain. Sometimes it is difficult to diagnose the exact cause of shoulder pain and referral to a specialist may be required. You may eventually need special tests such as CT scan, MRI, or arthroscopy (a procedure that uses special instruments to look inside the joint through a small incision).  Shoulder pain can be treated initially with a sling or shoulder immobilizer and anti-inflammatory medicines such as ibuprofen. Special shoulder exercises may be needed. Follow-up with a specialist is important when pain is severe or does not go away after a few weeks.  Home Care:    If a sling was provided, leave it in place for the time advised by your doctor. If you are unsure how long to wear it, ask for advice. If the sling becomes loose, adjust it so that your forearm is level with the ground and the shoulder feels well supported.    Apply an ice pack (ice cubes in a plastic bag, wrapped in a towel) over the injured area for 20 minutes every 1 to 2 hours the first day for pain relief. Continue this 3 to 4 times a day until the pain and swelling go away.    You may use acetaminophen (Tylenol) or ibuprofen (Motrin, Advil) to control pain, unless another pain medicine was prescribed. (NOTE: If you have chronic liver or kidney disease or ever had a stomach ulcer or GI bleeding, talk with your doctor before using these medicines.)    Shoulder pain may seem worse at night, when there is less to distract you from the  pain. If you sleep on your side, try to keep your weight off your painful shoulder. Propping pillows behind you may prevent you from rolling over onto that shoulder during.     Shoulder joints become stiff if left in a sling for too long. Range-of-motion exercises should usually be started within the first 10 days after injury. Consult your doctor on what type of exercises to do and how soon to start. You may remove the sling to shower or bathe.  Follow Up  with your doctor, or as advised by our staff, if you are not starting to improve within the next 5 days.  Get Prompt Medical Attention  if any of the following occur:    Pain or swelling increases    Hand or fingers becomes cold, blue, numb, or tingly    Large amount of bruising of the shoulder or upper arm    9646-5524 [facility], [address] [place] [address], [place] [address]. All rights reserved. This information is not intended as a substitute for professional medical care. Always follow your healthcare professional's instructions.              Follow-ups after your visit        Follow-up notes from your care team     Return if symptoms worsen or fail to improve.      Who to contact     If you have questions or need follow up information about today's clinic visit or your schedule please contact [facility] directly at [phone].  Normal or non-critical lab and imaging results will be communicated to you by MedVentivehart, letter or phone within 4 business days after the clinic has received the results. If you do not hear from us within 7 days, please contact the clinic through MedVentivehart or phone. If you have a critical or abnormal lab result, we will notify you by phone as soon as possible.  Submit refill requests through [employer] or call your pharmacy and they will forward the refill request to us. Please allow 3 business days for your refill to be completed.          Additional Information About Your Visit        MyChart Information     [employer]  gives you secure access to your electronic health record. If you see a primary care provider, you can also send messages to your care team and make appointments. If you have questions, please call your primary care clinic.  If you do not have a primary care provider, please call [phone] and they will assist you.        Care EveryWhere ID     This is your Care EveryWhere ID. This could be used by other organizations to access your [facility] medical records  [id number]        Your Vitals Were     Pulse Temperature Pulse Oximetry BMI (Body Mass Index)          64 98.7  F (37.1  C) (Oral) 98% 28.73 kg/m2         Blood Pressure from Last 3 Encounters:   09/03/17 123/80   08/26/17 140/87   08/02/17 131/79    Weight from Last 3 Encounters:   09/03/17 211 lb 12.8 oz (96.1 kg)   08/26/17 205 lb (93 kg)   08/02/17 211 lb (95.7 kg)              Today, you had the following     No orders found for display         Today's Medication Changes          These changes are accurate as of: 9/3/17  2:23 PM.  If you have any questions, ask your nurse or doctor.               Start taking these medicines.        Dose/Directions    indomethacin 50 MG capsule   Commonly known as:  INDOCIN   Used for:  Acute pain of right shoulder   Started by:  Devang Guerin PA        Dose:  50 mg   Take 1 capsule (50 mg) by mouth 3 times daily (with meals)   Quantity:  30 capsule   Refills:  0       ketorolac 60 MG/2ML Soln injection   Commonly known as:  TORADOL   Used for:  Acute pain of right shoulder   Started by:  Devang Guerin PA        Dose:  60 mg   Inject 2 mLs (60 mg) into the muscle once for 1 dose   Quantity:  2 mL   Refills:  0       predniSONE 20 MG tablet   Commonly known as:  DELTASONE   Used for:  Acute pain of right shoulder   Started by:  Devang Guerin PA        3 tabs once daily   Quantity:  15 tablet   Refills:  0            Where to get your medicines      These medications were sent to  [id number]  CHERY SAENZ - [address] AT [facility] [address] [place]  [address] [facility] [place] [address]     Phone:  [phone]     indomethacin 50 MG capsule    predniSONE 20 MG tablet         Some of these will need a paper prescription and others can be bought over the counter.  Ask your nurse if you have questions.     You don't need a prescription for these medications     ketorolac 60 MG/2ML Soln injection                Primary Care Provider Office Phone # Fax #    Duane Foy [phone] [address] [facility] [address]        Equal Access to Services     WOLF CARMONA : Hadii billy alexandero Soliam, waaxda luqadaha, qaybta kaalmada adekirstyyada, villa koroma. So [facility] [phone].    ATENCIÓN: Si habla español, tiene a feng disposición servicios gratuitos de asistencia lingüística. Llame al [phone].    We comply with applicable federal civil rights laws and [place] laws. We do not discriminate on the basis of race, color, national origin, age, disability sex, sexual orientation or gender identity.            Thank you!     Thank you for choosing [facility]  for your care. Our goal is always to provide you with excellent care. Hearing back from our patients is one way we can continue to improve our services. Please take a few minutes to complete the written survey that you may receive in the mail after your visit with us. Thank you!             Your Updated Medication List - Protect others around you: Learn how to safely use, store and throw away your medicines at www.disposemymeds.org.          This list is accurate as of: 9/3/17  2:23 PM.  Always use your most recent med list.                   Brand Name Dispense Instructions for use Diagnosis    azithromycin 250 MG tablet    ZITHROMAX Z-EILEEN    6 tablet    2 tabs day one then 1 tab qd    Throat pain, Acute sinusitis with  coexisting condition, need prophylactic treatment       HYDROcodone-acetaminophen 5-325 MG per tablet    NORCO    15 tablet    Take 1-2 tablets by mouth every 4 hours as needed for moderate to severe pain        * ibuprofen 200 MG tablet    ADVIL/MOTRIN    60 tablet    Take 3 tablets (600 mg) by mouth every 6 hours as needed for mild pain        * IBUPROFEN PO      Take 1 tablet by mouth daily.        indomethacin 50 MG capsule    INDOCIN    30 capsule    Take 1 capsule (50 mg) by mouth 3 times daily (with meals)    Acute pain of right shoulder       ketorolac 60 MG/2ML Soln injection    TORADOL    2 mL    Inject 2 mLs (60 mg) into the muscle once for 1 dose    Acute pain of right shoulder       predniSONE 20 MG tablet    DELTASONE    15 tablet    3 tabs once daily    Acute pain of right shoulder       * Notice:  This list has 2 medication(s) that are the same as other medications prescribed for you. Read the directions carefully, and ask your doctor or other care provider to review them with you.

## 2017-09-03 NOTE — PATIENT INSTRUCTIONS
Based on your medical history and these are the current health maintenance or preventive care services that you are due for (some may have been done at this visit)  Health Maintenance Due   Topic Date Due     EYE EXAM Q1 YEAR  11/11/2016     INFLUENZA VACCINE (SYSTEM ASSIGNED)  09/01/2017         At [facility], we strive to deliver an exceptional experience to you, every time we see you.    If you receive a survey in the mail, please send us back your thoughts. We really do value your feedback.    Your care team's suggested websites for health information:  Www.[email or web address].org : Up to date and easily searchable information on multiple topics.  Www.medlineplus.gov : medication info, interactive tutorials, watch real surgeries online  Www.familydoctor.org : good info from the Academy of Family Physicians  Www.cdc.gov : public health info, travel advisories, epidemics (H1N1)  Www.aap.org : children's health info, normal development, vaccinations  Www.health.[facility].mn.us : [place] dept of health, public health issues in [place], N1N1    How to contact your care team:   Team Lea/Shon [phone]         Pharmacy [phone]    Dr. Edgar, Kimberly Wise PA-C, Dr. Chaidez, Roselyn KINGSTON CNP, Mariama Dubose PA-C, Dr. Guillen, and THEE Navarro CNP    Team RNs: Ysabel Perez      Clinic hours  M-Th 7 am-7 pm   Fri 7 am-5 pm.   Urgent care M-F 11 am-9 pm,   Sat/Sun 9 am-5 pm.  Pharmacy M-Th 8 am-8 pm Fri 8 am-6 pm  Sat/Sun 9 am-5 pm.     All password changes, disabled accounts, or ID changes in [email or web address] will be done by our Access Services Department.    If you need help with your account or password, call: [phone]. Clinic staff no longer has the ability to change passwords.       Shoulder Pain (Uncertain Cause)  Shoulder pain often arises from the structures that surround the shoulder joint (the joint capsule, ligaments, tendons, muscles, and bursa). The joint itself contains  cartilage that can become worn out or injured and can also be a source of pain. The correct treatment requires knowing the cause of the pain. Sometimes it is difficult to diagnose the exact cause of shoulder pain and referral to a specialist may be required. You may eventually need special tests such as CT scan, MRI, or arthroscopy (a procedure that uses special instruments to look inside the joint through a small incision).  Shoulder pain can be treated initially with a sling or shoulder immobilizer and anti-inflammatory medicines such as ibuprofen. Special shoulder exercises may be needed. Follow-up with a specialist is important when pain is severe or does not go away after a few weeks.  Home Care:    If a sling was provided, leave it in place for the time advised by your doctor. If you are unsure how long to wear it, ask for advice. If the sling becomes loose, adjust it so that your forearm is level with the ground and the shoulder feels well supported.    Apply an ice pack (ice cubes in a plastic bag, wrapped in a towel) over the injured area for 20 minutes every 1 to 2 hours the first day for pain relief. Continue this 3 to 4 times a day until the pain and swelling go away.    You may use acetaminophen (Tylenol) or ibuprofen (Motrin, Advil) to control pain, unless another pain medicine was prescribed. (NOTE: If you have chronic liver or kidney disease or ever had a stomach ulcer or GI bleeding, talk with your doctor before using these medicines.)    Shoulder pain may seem worse at night, when there is less to distract you from the pain. If you sleep on your side, try to keep your weight off your painful shoulder. Propping pillows behind you may prevent you from rolling over onto that shoulder during.     Shoulder joints become stiff if left in a sling for too long. Range-of-motion exercises should usually be started within the first 10 days after injury. Consult your doctor on what type of exercises to do and  how soon to start. You may remove the sling to shower or bathe.  Follow Up  with your doctor, or as advised by our staff, if you are not starting to improve within the next 5 days.  Get Prompt Medical Attention  if any of the following occur:    Pain or swelling increases    Hand or fingers becomes cold, blue, numb, or tingly    Large amount of bruising of the shoulder or upper arm    3558-1163 [employer] [address], [place] [address]. All rights reserved. This information is not intended as a substitute for professional medical care. Always follow your healthcare professional's instructions.

## 2017-09-03 NOTE — PROGRESS NOTES
SUBJECTIVE:   Asher Lynn is a 21 year old male who presents to clinic today for the following health issues:      ED/UC Followup:    Facility:   Emergency Dept   Date of visit: 8/26/2017  Reason for visit: Shoulder Injury  Current Status: Pain gotten worse and less mobility MRI ([facility] ortho) was done on Wednesday       rt shoulder inj last week playing football- was thought to have had a dislocation which self reduced. Reports went to ED and XRs were normal, saw ortho as above.  Pain has been getting worse.  Hx prev dislocation and labrum tear.          Allergies   Allergen Reactions     Latex Anaphylaxis and Hives     With a balloon     Morphine Hives       Past Medical History:   Diagnosis Date     Acne      Nephrolithiasis 12/15    Calcium oxalate stones         Current Outpatient Prescriptions on File Prior to Visit:  ibuprofen (ADVIL/MOTRIN) 200 MG tablet Take 3 tablets (600 mg) by mouth every 6 hours as needed for mild pain   HYDROcodone-acetaminophen (NORCO) 5-325 MG per tablet Take 1-2 tablets by mouth every 4 hours as needed for moderate to severe pain (Patient not taking: Reported on 9/3/2017)   azithromycin (ZITHROMAX Z-EILEEN) 250 MG tablet 2 tabs day one then 1 tab qd (Patient not taking: Reported on 9/3/2017)   IBUPROFEN PO Take 1 tablet by mouth daily.     No current facility-administered medications on file prior to visit.     Social History   Substance Use Topics     Smoking status: Never Smoker     Smokeless tobacco: Never Used     Alcohol use 0.0 oz/week     0 Standard drinks or equivalent per week       ROS:  General: No fevers  Musculoskel: + as above  Skin: as above    OBJECTIVE:  /80 (BP Location: Left arm, Patient Position: Sitting, Cuff Size: Adult Large)  Pulse 64  Temp 98.7  F (37.1  C) (Oral)  Wt 211 lb 12.8 oz (96.1 kg)  SpO2 98%  BMI 28.73 kg/m2   General:   awake, alert, and cooperative.  NAD.   Head: Normocephalic, atraumatic.  Eyes: Conjunctiva clear,    MS:  rt SHOULDER:  neg apley, neg cross arm, neg drop arm, + anterior TTP  Neuro: Alert and oriented - normal speech.    ASSESSMENT:well appearing, discussed risk/benefits of antiinflammatories as below    ICD-10-CM    1. Acute pain of right shoulder M25.511 predniSONE (DELTASONE) 20 MG tablet     indomethacin (INDOCIN) 50 MG capsule     ketorolac (TORADOL) 60 MG/2ML SOLN injection           PLAN: f/u sports med.  Advised about symptoms which might herald more serious problems.

## 2017-09-03 NOTE — NURSING NOTE
The following medication was given:      MEDICATION: Ketorolac Tromethamine 60MG/2ML (30 mg/mL) (Toradol)  ROUTE: IM  SITE: Deltoid - Left  DOSE: 60mg   LOT #:  [id number]   :  [facility] [employer]  EXPIRATION DATE:  03/2019  NDC#: [id number]

## 2017-09-08 ENCOUNTER — TRANSFERRED RECORDS (OUTPATIENT)
Dept: HEALTH INFORMATION MANAGEMENT | Facility: CLINIC | Age: 22
End: 2017-09-08

## 2017-09-20 ENCOUNTER — OFFICE VISIT (OUTPATIENT)
Dept: FAMILY MEDICINE | Facility: CLINIC | Age: 22
End: 2017-09-20
Payer: COMMERCIAL

## 2017-09-20 VITALS
WEIGHT: 211 LBS | DIASTOLIC BLOOD PRESSURE: 86 MMHG | TEMPERATURE: 97.7 F | BODY MASS INDEX: 28.62 KG/M2 | HEART RATE: 72 BPM | SYSTOLIC BLOOD PRESSURE: 129 MMHG | OXYGEN SATURATION: 96 %

## 2017-09-20 DIAGNOSIS — Z01.818 PREOP GENERAL PHYSICAL EXAM: Primary | ICD-10-CM

## 2017-09-20 LAB
ALBUMIN UR-MCNC: NEGATIVE MG/DL
APPEARANCE UR: CLEAR
BILIRUB UR QL STRIP: NEGATIVE
COLOR UR AUTO: YELLOW
GLUCOSE UR STRIP-MCNC: NEGATIVE MG/DL
HGB BLD-MCNC: 15.7 G/DL (ref 13.3–17.7)
HGB UR QL STRIP: NEGATIVE
KETONES UR STRIP-MCNC: NEGATIVE MG/DL
LEUKOCYTE ESTERASE UR QL STRIP: NEGATIVE
NITRATE UR QL: NEGATIVE
PH UR STRIP: 6 PH (ref 5–7)
SOURCE: NORMAL
SP GR UR STRIP: 1.02 (ref 1–1.03)
UROBILINOGEN UR STRIP-ACNC: 0.2 EU/DL (ref 0.2–1)

## 2017-09-20 PROCEDURE — 36415 COLL VENOUS BLD VENIPUNCTURE: CPT | Performed by: INTERNAL MEDICINE

## 2017-09-20 PROCEDURE — 81003 URINALYSIS AUTO W/O SCOPE: CPT | Performed by: INTERNAL MEDICINE

## 2017-09-20 PROCEDURE — 85018 HEMOGLOBIN: CPT | Performed by: INTERNAL MEDICINE

## 2017-09-20 PROCEDURE — 99214 OFFICE O/P EST MOD 30 MIN: CPT | Performed by: INTERNAL MEDICINE

## 2017-09-20 ASSESSMENT — PAIN SCALES - GENERAL: PAINLEVEL: NO PAIN (0)
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
## 2017-09-20 NOTE — NURSING NOTE
Chief Complaint   Patient presents with     Pre-Op Exam       Initial /86 (BP Location: Right arm, Patient Position: Chair, Cuff Size: Adult Regular)  Pulse 72  Temp 97.7  F (36.5  C) (Oral)  Wt 211 lb (95.7 kg)  SpO2 96%  BMI 28.62 kg/m2 Estimated body mass index is 28.62 kg/(m^2) as calculated from the following:    Height as of 8/26/17: 6' (1.829 m).    Weight as of this encounter: 211 lb (95.7 kg).  Medication Reconciliation: complete   Vicki Black MA

## 2017-09-20 NOTE — MR AVS SNAPSHOT
After Visit Summary   9/20/2017    Asher Lynn    MRN: [id number]           Patient Information     Date Of Birth          1995        Visit Information        Provider Department      9/20/2017 3:40 PM Eliecer Reed MD [facility]        Today's Diagnoses     Preop general physical exam    -  1      Care Instructions      Before Your Surgery      Call your surgeon if there is any change in your health. This includes signs of a cold or flu (such as a sore throat, runny nose, cough, rash or fever).    Do not smoke, drink alcohol or take over the counter medicine (unless your surgeon or primary care doctor tells you to) for the 24 hours before and after surgery.    If you take prescribed drugs: Follow your doctor s orders about which medicines to take and which to stop until after surgery.    Eating and drinking prior to surgery: follow the instructions from your surgeon    Take a shower or bath the night before surgery. Use the soap your surgeon gave you to gently clean your skin. If you do not have soap from your surgeon, use your regular soap. Do not shave or scrub the surgery site.  Wear clean pajamas and have clean sheets on your bed.           Follow-ups after your visit        Who to contact     If you have questions or need follow up information about today's clinic visit or your schedule please contact [facility] directly at [phone].  Normal or non-critical lab and imaging results will be communicated to you by MyChart, letter or phone within 4 business days after the clinic has received the results. If you do not hear from us within 7 days, please contact the clinic through MyChart or phone. If you have a critical or abnormal lab result, we will notify you by phone as soon as possible.  Submit refill requests through [employer] or call your pharmacy and they will forward the refill request to us. Please allow 3 business days for  your refill to be completed.          Additional Information About Your Visit        [employer] Information     [employer] gives you secure access to your electronic health record. If you see a primary care provider, you can also send messages to your care team and make appointments. If you have questions, please call your primary care clinic.  If you do not have a primary care provider, please call [phone] and they will assist you.        Care EveryWhere ID     This is your Care EveryWhere ID. This could be used by other organizations to access your [place] medical records  [id number]        Your Vitals Were     Pulse Temperature Pulse Oximetry BMI (Body Mass Index)          72 97.7  F (36.5  C) (Oral) 96% 28.62 kg/m2         Blood Pressure from Last 3 Encounters:   09/20/17 129/86   09/03/17 123/80   08/26/17 140/87    Weight from Last 3 Encounters:   09/20/17 211 lb (95.7 kg)   09/03/17 211 lb 12.8 oz (96.1 kg)   08/26/17 205 lb (93 kg)              We Performed the Following     *UA reflex to Microscopic and Culture ([place] and [facility] (except [place] and [place])     Hemoglobin          Today's Medication Changes          These changes are accurate as of: 9/20/17  4:05 PM.  If you have any questions, ask your nurse or doctor.               These medicines have changed or have updated prescriptions.        Dose/Directions    ibuprofen 200 MG tablet   Commonly known as:  ADVIL/MOTRIN   This may have changed:  Another medication with the same name was removed. Continue taking this medication, and follow the directions you see here.        Dose:  600 mg   Take 3 tablets (600 mg) by mouth every 6 hours as needed for mild pain   Quantity:  60 tablet   Refills:  0         Stop taking these medicines if you haven't already. Please contact your care team if you have questions.     azithromycin 250 MG tablet   Commonly known as:  ZITHROMAX Z-EILEEN   Stopped by:  Eliecer Reed MD            HYDROcodone-acetaminophen 5-325 MG per tablet   Commonly known as:  NORCO   Stopped by:  Eliecer Reed MD           indomethacin 50 MG capsule   Commonly known as:  INDOCIN   Stopped by:  Eliecer Reed MD           predniSONE 20 MG tablet   Commonly known as:  DELTASONE   Stopped by:  Eliecer Reed MD                    Primary Care Provider Office Phone # Fax #    Duane Foy [phone]       [address] [address]        Equal Access to Services     [facility] : Hadii aad ku hadasho Soomaali, waaxda luqadaha, qaybta kaalmada adeegyada, waxay idiin hayaan adeeg kharash la'aan . So [facility] [phone].    ATENCIÓN: Si habla español, tiene a feng disposición servicios gratuitos de asistencia lingüística. [facility] [phone].    We comply with applicable federal civil rights laws and [place] laws. We do not discriminate on the basis of race, color, national origin, age, disability sex, sexual orientation or gender identity.            Thank you!     Thank you for choosing [facility]  for your care. Our goal is always to provide you with excellent care. Hearing back from our patients is one way we can continue to improve our services. Please take a few minutes to complete the written survey that you may receive in the mail after your visit with us. Thank you!             Your Updated Medication List - Protect others around you: Learn how to safely use, store and throw away your medicines at www.disposemymeds.org.          This list is accurate as of: 9/20/17  4:05 PM.  Always use your most recent med list.                   Brand Name Dispense Instructions for use Diagnosis    ibuprofen 200 MG tablet    ADVIL/MOTRIN    60 tablet    Take 3 tablets (600 mg) by mouth every 6 hours as needed for mild pain

## 2017-09-21 ENCOUNTER — TELEPHONE (OUTPATIENT)
Dept: FAMILY MEDICINE | Facility: CLINIC | Age: 22
End: 2017-09-21

## 2017-09-21 NOTE — TELEPHONE ENCOUNTER
Reason for Call:  Pre-op Exam, labs and EKG    Detailed comments: Please fax this over to [phone]. Patient has surgery on 09-.    Phone Number Patient can be reached at:   [facility] [phone]       Best Time: anytime    Can we leave a detailed message on this number? YES    Call taken on 9/21/2017 at 1:17 PM by Daisy Bowman

## 2017-09-26 ENCOUNTER — TELEPHONE (OUTPATIENT)
Dept: FAMILY MEDICINE | Facility: CLINIC | Age: 22
End: 2017-09-26

## 2017-09-26 NOTE — TELEPHONE ENCOUNTER
Letter faxed as requested. TC called number provided and spoke with patient's mother and informed that letter has been sent.

## 2017-09-26 NOTE — LETTER
[address] [place] [address]  Phone: [phone]  Fax: [phone]    September 26, 2017        Asher Lynn  [address] [place] [address]          To whom it may concern:    RE: Asher Guajardo apparently had a fairly mild reaction to a latex balloon as a child that responded to treatment with Benadryl. He did not have an anaphylactic reaction and has not required any epinephrine to treat this. It is unclear to me as to whether he is truly allergic to latex at this time.    Thank you for your consideration.      Sincerely,        Duane Foy MD

## 2017-09-26 NOTE — TELEPHONE ENCOUNTER
Reason for Call:  Other     Detailed comments: Patient is scheduled for surgery and according to his chart he has a latex allergy. This allergy was in regards to as a child he had a reaction to a latex balloon. Can this be removed from his allergies and a letter needs to be faxed to surgery center stating that he did not have anaphylactic shock or need an epi pen he was just given benadryl.     Phone Number Patient can be reached at: Home number on file [phone] (home)    Best Time:     Can we leave a detailed message on this number? Not Applicable    Call taken on 9/26/2017 at 10:26 AM by Cari Albrecht

## 2017-09-26 NOTE — TELEPHONE ENCOUNTER
I don't know that we can take the latex allergy off his chart, because I'm not sure that he is no longer allergic to latex.. I can write a letter, however, addressing the other issues and I have done so for him.  Please fax the letter as requested.

## 2017-09-26 NOTE — TELEPHONE ENCOUNTER
Patient calling to check on status of message.  Informed that letter was done.  Please fax letter to number below.

## 2017-09-27 ENCOUNTER — TELEPHONE (OUTPATIENT)
Dept: FAMILY MEDICINE | Facility: CLINIC | Age: 22
End: 2017-09-27

## 2017-09-27 NOTE — TELEPHONE ENCOUNTER
Routed to provider, is there a way to test for latex allergy with blood test?  Referral to allergist?  Dorita Tinsley RN  [facility]

## 2017-09-27 NOTE — TELEPHONE ENCOUNTER
He should see one of our allergists if he is looking to have a definitive assessment of his latex allergy status.

## 2017-09-27 NOTE — TELEPHONE ENCOUNTER
TC called number provided and call was answered by mother Rosario. There is consent to communicate with her on file. Message below communicated to her and she will let patient know.

## 2017-09-27 NOTE — TELEPHONE ENCOUNTER
Reason for Call:  Other     Detailed comments: Patient would like to know if he needs to be seen to have the latex allergy removed or can he schedule a blood test.    Phone Number Patient can be reached at: Home number on file [phone] (home)    Best Time:     Can we leave a detailed message on this number? Not Applicable    Call taken on 9/27/2017 at 11:12 AM by Cari Albrecht

## 2018-11-10 ENCOUNTER — HOSPITAL ENCOUNTER (EMERGENCY)
Facility: CLINIC | Age: 23
Discharge: HOME | End: 2018-11-10
Attending: INTERNAL MEDICINE | Admitting: INTERNAL MEDICINE
Payer: COMMERCIAL

## 2018-11-10 ENCOUNTER — OFFICE VISIT (OUTPATIENT)
Dept: URGENT CARE | Facility: URGENT CARE | Age: 23
End: 2018-11-10
Payer: COMMERCIAL

## 2018-11-10 ENCOUNTER — APPOINTMENT (OUTPATIENT)
Dept: CT IMAGING | Facility: CLINIC | Age: 23
End: 2018-11-10
Attending: INTERNAL MEDICINE
Payer: COMMERCIAL

## 2018-11-10 ENCOUNTER — NURSE TRIAGE (OUTPATIENT)
Dept: NURSING | Facility: CLINIC | Age: 23
End: 2018-11-10

## 2018-11-10 VITALS
OXYGEN SATURATION: 99 % | SYSTOLIC BLOOD PRESSURE: 125 MMHG | HEIGHT: 72 IN | HEART RATE: 77 BPM | WEIGHT: 210 LBS | TEMPERATURE: 97.7 F | BODY MASS INDEX: 28.44 KG/M2 | DIASTOLIC BLOOD PRESSURE: 75 MMHG

## 2018-11-10 VITALS
WEIGHT: 217.37 LBS | SYSTOLIC BLOOD PRESSURE: 122 MMHG | HEART RATE: 73 BPM | DIASTOLIC BLOOD PRESSURE: 62 MMHG | BODY MASS INDEX: 29.48 KG/M2 | RESPIRATION RATE: 12 BRPM | OXYGEN SATURATION: 99 % | TEMPERATURE: 98.1 F

## 2018-11-10 DIAGNOSIS — R10.31 ABDOMINAL PAIN, RIGHT LOWER QUADRANT: ICD-10-CM

## 2018-11-10 DIAGNOSIS — R10.33 PERIUMBILICAL ABDOMINAL PAIN: Primary | ICD-10-CM

## 2018-11-10 LAB
ALBUMIN UR-MCNC: NEGATIVE MG/DL
ANION GAP SERPL CALCULATED.3IONS-SCNC: 3 MMOL/L (ref 3–14)
APPEARANCE UR: CLEAR
BASOPHILS # BLD AUTO: 0 10E9/L (ref 0–0.2)
BASOPHILS NFR BLD AUTO: 0.5 %
BILIRUB UR QL STRIP: NEGATIVE
BUN SERPL-MCNC: 14 MG/DL (ref 7–30)
CALCIUM SERPL-MCNC: 8.7 MG/DL (ref 8.5–10.1)
CHLORIDE SERPL-SCNC: 108 MMOL/L (ref 94–109)
CO2 SERPL-SCNC: 30 MMOL/L (ref 20–32)
COLOR UR AUTO: YELLOW
CREAT SERPL-MCNC: 1.05 MG/DL (ref 0.66–1.25)
DIFFERENTIAL METHOD BLD: NORMAL
EOSINOPHIL # BLD AUTO: 0.2 10E9/L (ref 0–0.7)
EOSINOPHIL NFR BLD AUTO: 3.9 %
ERYTHROCYTE [DISTWIDTH] IN BLOOD BY AUTOMATED COUNT: 12.4 % (ref 10–15)
GFR SERPL CREATININE-BSD FRML MDRD: 86 ML/MIN/1.7M2
GLUCOSE SERPL-MCNC: 79 MG/DL (ref 70–99)
GLUCOSE UR STRIP-MCNC: NEGATIVE MG/DL
HCT VFR BLD AUTO: 46.4 % (ref 40–53)
HGB BLD-MCNC: 15.7 G/DL (ref 13.3–17.7)
HGB UR QL STRIP: NEGATIVE
IMM GRANULOCYTES # BLD: 0 10E9/L (ref 0–0.4)
IMM GRANULOCYTES NFR BLD: 0.3 %
KETONES UR STRIP-MCNC: NEGATIVE MG/DL
LEUKOCYTE ESTERASE UR QL STRIP: NEGATIVE
LYMPHOCYTES # BLD AUTO: 2.2 10E9/L (ref 0.8–5.3)
LYMPHOCYTES NFR BLD AUTO: 36.2 %
MCH RBC QN AUTO: 30.8 PG (ref 26.5–33)
MCHC RBC AUTO-ENTMCNC: 33.8 G/DL (ref 31.5–36.5)
MCV RBC AUTO: 91 FL (ref 78–100)
MONOCYTES # BLD AUTO: 0.2 10E9/L (ref 0–1.3)
MONOCYTES NFR BLD AUTO: 3.7 %
MUCOUS THREADS #/AREA URNS LPF: PRESENT /LPF
NEUTROPHILS # BLD AUTO: 3.3 10E9/L (ref 1.6–8.3)
NEUTROPHILS NFR BLD AUTO: 55.4 %
NITRATE UR QL: NEGATIVE
NRBC # BLD AUTO: 0 10*3/UL
NRBC BLD AUTO-RTO: 0 /100
PH UR STRIP: 5.5 PH (ref 5–7)
PLATELET # BLD AUTO: 232 10E9/L (ref 150–450)
POTASSIUM SERPL-SCNC: 4 MMOL/L (ref 3.4–5.3)
RBC # BLD AUTO: 5.09 10E12/L (ref 4.4–5.9)
RBC #/AREA URNS AUTO: <1 /HPF (ref 0–2)
SODIUM SERPL-SCNC: 141 MMOL/L (ref 133–144)
SOURCE: ABNORMAL
SP GR UR STRIP: 1.02 (ref 1–1.03)
UROBILINOGEN UR STRIP-MCNC: NORMAL MG/DL (ref 0–2)
WBC # BLD AUTO: 6 10E9/L (ref 4–11)
WBC #/AREA URNS AUTO: <1 /HPF (ref 0–5)

## 2018-11-10 PROCEDURE — 85025 COMPLETE CBC W/AUTO DIFF WBC: CPT | Performed by: STUDENT IN AN ORGANIZED HEALTH CARE EDUCATION/TRAINING PROGRAM

## 2018-11-10 PROCEDURE — 25000125 ZZHC RX 250: Performed by: STUDENT IN AN ORGANIZED HEALTH CARE EDUCATION/TRAINING PROGRAM

## 2018-11-10 PROCEDURE — 81001 URINALYSIS AUTO W/SCOPE: CPT | Performed by: EMERGENCY MEDICINE

## 2018-11-10 PROCEDURE — 96376 TX/PRO/DX INJ SAME DRUG ADON: CPT | Performed by: INTERNAL MEDICINE

## 2018-11-10 PROCEDURE — 99285 EMERGENCY DEPT VISIT HI MDM: CPT | Mod: 25 | Performed by: INTERNAL MEDICINE

## 2018-11-10 PROCEDURE — 25000128 H RX IP 250 OP 636: Performed by: STUDENT IN AN ORGANIZED HEALTH CARE EDUCATION/TRAINING PROGRAM

## 2018-11-10 PROCEDURE — 96361 HYDRATE IV INFUSION ADD-ON: CPT | Performed by: INTERNAL MEDICINE

## 2018-11-10 PROCEDURE — 25000128 H RX IP 250 OP 636: Performed by: INTERNAL MEDICINE

## 2018-11-10 PROCEDURE — 99215 OFFICE O/P EST HI 40 MIN: CPT | Performed by: PHYSICIAN ASSISTANT

## 2018-11-10 PROCEDURE — 80048 BASIC METABOLIC PNL TOTAL CA: CPT | Performed by: STUDENT IN AN ORGANIZED HEALTH CARE EDUCATION/TRAINING PROGRAM

## 2018-11-10 PROCEDURE — 74177 CT ABD & PELVIS W/CONTRAST: CPT

## 2018-11-10 PROCEDURE — 96374 THER/PROPH/DIAG INJ IV PUSH: CPT | Mod: 59 | Performed by: INTERNAL MEDICINE

## 2018-11-10 PROCEDURE — 96375 TX/PRO/DX INJ NEW DRUG ADDON: CPT | Performed by: INTERNAL MEDICINE

## 2018-11-10 PROCEDURE — 99284 EMERGENCY DEPT VISIT MOD MDM: CPT | Mod: Z6 | Performed by: INTERNAL MEDICINE

## 2018-11-10 PROCEDURE — 99207 ZZC FOR CODING REVIEW: CPT | Performed by: PHYSICIAN ASSISTANT

## 2018-11-10 RX ORDER — HYDROMORPHONE HCL/0.9% NACL/PF 0.2MG/0.2
0.2 SYRINGE (ML) INTRAVENOUS ONCE
Status: COMPLETED | OUTPATIENT
Start: 2018-11-10 | End: 2018-11-10

## 2018-11-10 RX ORDER — KETOROLAC TROMETHAMINE 30 MG/ML
30 INJECTION, SOLUTION INTRAMUSCULAR; INTRAVENOUS ONCE
Status: COMPLETED | OUTPATIENT
Start: 2018-11-10 | End: 2018-11-10

## 2018-11-10 RX ORDER — IOPAMIDOL 755 MG/ML
132 INJECTION, SOLUTION INTRAVASCULAR ONCE
Status: COMPLETED | OUTPATIENT
Start: 2018-11-10 | End: 2018-11-10

## 2018-11-10 RX ORDER — SIMETHICONE 125 MG
125 CAPSULE ORAL EVERY 6 HOURS PRN
Qty: 28 CAPSULE | Refills: 0 | Status: SHIPPED | OUTPATIENT
Start: 2018-11-10 | End: 2020-04-23

## 2018-11-10 RX ORDER — ACETAMINOPHEN 500 MG
1000 TABLET ORAL EVERY 6 HOURS PRN
Qty: 60 TABLET | Refills: 0 | Status: SHIPPED | OUTPATIENT
Start: 2018-11-10 | End: 2018-11-17

## 2018-11-10 RX ADMIN — Medication 0.2 MG: at 19:52

## 2018-11-10 RX ADMIN — Medication 0.2 MG: at 21:15

## 2018-11-10 RX ADMIN — SODIUM CHLORIDE, POTASSIUM CHLORIDE, SODIUM LACTATE AND CALCIUM CHLORIDE 1000 ML: 600; 310; 30; 20 INJECTION, SOLUTION INTRAVENOUS at 19:55

## 2018-11-10 RX ADMIN — KETOROLAC TROMETHAMINE 30 MG: 30 INJECTION, SOLUTION INTRAMUSCULAR at 22:33

## 2018-11-10 RX ADMIN — IOPAMIDOL 132 ML: 755 INJECTION, SOLUTION INTRAVENOUS at 21:31

## 2018-11-10 RX ADMIN — SODIUM CHLORIDE, PRESERVATIVE FREE 83 ML: 5 INJECTION INTRAVENOUS at 21:31

## 2018-11-10 ASSESSMENT — ENCOUNTER SYMPTOMS
FLANK PAIN: 0
BLURRED VISION: 0
HEADACHES: 0
COUGH: 0
BACK PAIN: 0
VOMITING: 0
BLOOD IN STOOL: 0
SORE THROAT: 0
CONSTIPATION: 0
DYSURIA: 0
NAUSEA: 0
FEVER: 0
HEMATURIA: 0
DIARRHEA: 0
SHORTNESS OF BREATH: 0
ABDOMINAL PAIN: 1

## 2018-11-10 NOTE — PROGRESS NOTES
"SUBJECTIVE:   Asher Lynn is a 23 year old male presenting for evaluation of   Chief Complaint   Patient presents with     Urgent Care     Abdominal Pain     c/o stomach pain for 1 day   .  Periumbilical pain since this morning. Pain feels \"sharp and stabbing when he is sitting.\" When he stands up or lays down, it is more dull aching.   He layed down to rest today but kept waking up periodically due to pain. Pain is worse with ambulation and when he touches the area. No nausea, vomiting. Last BM this morning was normal in caliber. No diarrhea. No fever. He has a normal appetite and is eating normally today. No dysuria, hematuria. No sore throat. No testicular pain or swelling.  No history of abdominal surgeries.   He has not taken anything for pain.  He does have a history of kidney stones x 2 but states this is not the same pain as that at all.    Review of Systems   Constitutional: Negative for fever.   HENT: Negative for sore throat.    Eyes: Negative for blurred vision.   Respiratory: Negative for cough and shortness of breath.    Cardiovascular: Positive for chest pain.   Gastrointestinal: Positive for abdominal pain. Negative for blood in stool, constipation, diarrhea, nausea and vomiting.   Genitourinary: Negative for dysuria, flank pain and hematuria.   Musculoskeletal: Negative for back pain.   Skin: Negative for rash.   Neurological: Negative for headaches.       I personally reviewed PMH, PSH, FH, and SH.      PMH:  Past Medical History:   Diagnosis Date     Acne      Nephrolithiasis 12/15    Calcium oxalate stones     Patient Active Problem List    Diagnosis Date Noted     Splenic cyst 03/03/2016     Priority: Medium     Nephrolithiasis      Priority: Medium     Shoulder impingement - right 06/14/2013     Priority: Medium     Glenoid labrum tear - right 06/14/2013     Priority: Medium         Current medications:  Current Outpatient Prescriptions   Medication Sig Dispense Refill     ibuprofen " (ADVIL/MOTRIN) 200 MG tablet Take 3 tablets (600 mg) by mouth every 6 hours as needed for mild pain (Patient not taking: Reported on 11/10/2018) 60 tablet 0       Surgical History:  Past Surgical History:   Procedure Laterality Date     ENDOSCOPY  3-20-15     IR URETERAL STENT PLACEMENT LEFT  2/29/16    at [facility]     ORIF left hand fracture  3/19/2010    4th metacarpal fracture     ORTHOPEDIC SURGERY      sternum and L clavicle surgery     repair of right great toe torn ligament Right 1/10/14    Dr. James     rerpair of left sternoclavicular dislocation  08/2016     SURGICAL HISTORY OF -   5/26/2010    removal of hardware from 4th metacarpal fx       Family history:  Family History   Problem Relation Age of Onset     Hypertension Mother      Glaucoma No family hx of      Macular Degeneration No family hx of      Cancer No family hx of      Cerebrovascular Disease No family hx of      Thyroid Disease No family hx of          Social History:  Social History   Substance Use Topics     Smoking status: Never Smoker     Smokeless tobacco: Never Used     Alcohol use 0.0 oz/week     0 Standard drinks or equivalent per week         OBJECTIVE:  /75  Pulse 77  Temp 97.7  F (36.5  C) (Tympanic)  Ht 6' (1.829 m)  Wt 210 lb (95.3 kg)  SpO2 99%  BMI 28.48 kg/m2    Physical Exam  General: alert, appears mildly uncomfortable seated but in NAD. Afebrile.  Skin: no suspicious lesions or rashes.  HEENT:  MMMs.  Respiratory: No distress. Equal inspiration to bilateral bases. No crackles wheeze, rhonchi, rales.   Cardiovascular: RRR. No murmurs, clicks, gallups, or rub.  Gastrointestinal: Abdomen soft, nondistended. Significant tenderness in periumbilical region without rigidity or guarding. BS present throughout. No masses, organomegaly.  Neurologic: Follows commands.  Psychiatric: mentation appears normal and affect normal/bright           Labs:  No results found for this or any previous visit (from the past 24  hour(s)).        ASSESSMENT/PLAN:      ICD-10-CM    1. Periumbilical abdominal pain R10.33         Medical Decision Making:    Serious Comorbid Conditions: history of nephrolithiasis    Patient presents with acute periumbilical abdominal pain x 1 day. VS were WNL upon presentation today. Patient appeared to visibly be in pain. Abdominal exam significant for tenderness in the periumbilical region without guarding.  Differential Diagnosis:   -acute appendicitis- high suspicion  -kidney stone- atypical pain for this, but does have history of such  -UTI    Discussed with patient that I cannot rule out appendicitis here without a CT scan and that he should be seen in the ER for complete workup. He understood and agreed. Patient was appropriate for transport via private car.          Haley Schafer PA-C  11/10/18 4:35 PM

## 2018-11-10 NOTE — TELEPHONE ENCOUNTER
Additional Information    Negative: [1] Caller is not with the adult (patient) AND [2] reporting urgent symptoms    Negative: Lab result questions    Negative: Medication questions    Negative: Caller cannot be reached by phone    Negative: Caller has already spoken to PCP or another triager    Negative: RN needs further essential information from caller in order to complete triage    Negative: Requesting regular office appointment    Negative: [1] Caller requesting NON-URGENT health information AND [2] PCP's office is the best resource    Negative: Health Information question, no triage required and triager able to answer question    General information question, no triage required and triager able to answer question    Protocols used: INFORMATION ONLY CALL-ADULT-AH  Pt has spoken to another nurse line that said he should be seen. He is wondering if Dr Foy is on call. He is not, so patient said he was just going to go in. Refused triage at this point since he spoke with another nurse line.    Sonali Kat RN  [place] [facility] RN  Lung Nodule and ED Lab Result F/u RN

## 2018-11-10 NOTE — ED AVS SNAPSHOT
[facility], Emergency Department    [address] [address]    Phone:  [phone]                                       Asher Lynn   MRN: [id number]    Department:  [facility], Emergency Department   Date of Visit:  11/10/2018           Patient Information     Date Of Birth          1995        Your diagnoses for this visit were:     Abdominal pain, right lower quadrant        You were seen by Hilda Bedolla MD.      Follow-up Information     Follow up with Duane Foy MD In 1 week.    Specialty:  Family Practice    Contact information:    [address] [facility] [id number] [phone]          Discharge Instructions       1.  He was seen in the ED today for evaluation of abdominal pain.  Sensation stable and did not appear to have any acute emergency.  We obtained labs and imaging studies which showed that he did not have any acute infection for appendicitis at this time.  It is likely that your abdominal pain may be as a result of stool burden and anticipate your pain will improve with bowel movement and time.  We gave you some pain medication while in the year which seemed to have helped.  We believe it is safe for you to be discharged with follow-up to her primary care doctor.  We prescribed some pain medications which you can  from the pharmacy.     2.  Please follow-up with your primary care doctor in 1 week.    3.  Please return to the emergency room if he started having worsening or new symptoms.    Please make an appointment to follow up with Your Primary Care Provider in 5-7 days even if entirely better.      24 Hour Appointment Hotline       To make an appointment at any [facility], call [phone] ([phone]). If you don't have a family doctor or clinic, we will help you find one. [facility] are conveniently located to serve the needs of you and your family.             Review of your medicines      START taking         Dose / Directions Last dose taken    acetaminophen 500 MG tablet   Commonly known as:  TYLENOL   Dose:  1000 mg   Quantity:  60 tablet        Take 2 tablets (1,000 mg) by mouth every 6 hours as needed for pain or fever Do not take more than 4g (8 tablets) in 24hrs   Refills:  0        Simethicone 125 MG Caps   Dose:  125 mg   Quantity:  28 capsule        Take 125 mg by mouth every 6 hours as needed (For abdominal discomfort)   Refills:  0          Our records show that you are taking the medicines listed below. If these are incorrect, please call your family doctor or clinic.        Dose / Directions Last dose taken    ibuprofen 200 MG tablet   Commonly known as:  ADVIL/MOTRIN   Dose:  600 mg   Quantity:  60 tablet        Take 3 tablets (600 mg) by mouth every 6 hours as needed for mild pain   Refills:  0                Prescriptions were sent or printed at these locations (2 Prescriptions)                   Other Prescriptions                Printed at Department/Unit printer (2 of 2)         acetaminophen (TYLENOL) 500 MG tablet               Simethicone 125 MG CAPS                Procedures and tests performed during your visit     Basic metabolic panel    CBC with platelets differential    CT Abdomen Pelvis w Contrast    Routine UA with microscopic      Orders Needing Specimen Collection     None      Pending Results     No orders found from 11/8/2018 to 11/11/2018.            Pending Culture Results     No orders found from 11/8/2018 to 11/11/2018.            Pending Results Instructions     If you had any lab results that were not finalized at the time of your Discharge, you can call the ED Lab Result RN at [phone]. You will be contacted by this team for any positive Lab results or changes in treatment. The nurses are available 7 days a week from 10A to 6:30P.  You can leave a message 24 hours per day and they will return your call.        Thank you for choosing [facility]       Thank you for choosing  [facility] for your care. Our goal is always to provide you with excellent care. Hearing back from our patients is one way we can continue to improve our services. Please take a few minutes to complete the written survey that you may receive in the mail after you visit with us. Thank you!        [facility] Information     [employer] gives you secure access to your electronic health record. If you see a primary care provider, you can also send messages to your care team and make appointments. If you have questions, please call your primary care clinic.  If you do not have a primary care provider, please call [phone] and they will assist you.        Care EveryWhere ID     This is your Care EveryWhere ID. This could be used by other organizations to access your [facility] medical records  [id number]        Equal Access to Services     NICOLE CARMOAN : Osvaldo Salazar, toña metzger, diamante kaekaterina [facility], villa koroma. So [facility] [phone].    ATENCIÓN: Si habla español, tiene a feng disposición servicios gratuitos de asistencia lingüística. Llame al [phone].    We comply with applicable federal civil rights laws and [place] laws. We do not discriminate on the basis of race, color, national origin, age, disability, sex, sexual orientation, or gender identity.            After Visit Summary       This is your record. Keep this with you and show to your community pharmacist(s) and doctor(s) at your next visit.

## 2018-11-10 NOTE — MR AVS SNAPSHOT
After Visit Summary   11/10/2018    Asher Lynn    MRN: [id number]           Patient Information     Date Of Birth          1995        Visit Information        Provider Department      11/10/2018 4:05 PM Haley Schafer PA-C [facility] [place]        Today's Diagnoses     Periumbilical abdominal pain    -  1       Follow-ups after your visit        Who to contact     If you have questions or need follow up information about today's clinic visit or your schedule please contact [facility] directly at [phone].  Normal or non-critical lab and imaging results will be communicated to you by G.ho.sthart, letter or phone within 4 business days after the clinic has received the results. If you do not hear from us within 7 days, please contact the clinic through Fluthert or phone. If you have a critical or abnormal lab result, we will notify you by phone as soon as possible.  Submit refill requests through Ark[employer] or call your pharmacy and they will forward the refill request to us. Please allow 3 business days for your refill to be completed.          Additional Information About Your Visit        MyChart Information     [employer] gives you secure access to your electronic health record. If you see a primary care provider, you can also send messages to your care team and make appointments. If you have questions, please call your primary care clinic.  If you do not have a primary care provider, please call [phone] and they will assist you.        Care EveryWhere ID     This is your Care EveryWhere ID. This could be used by other organizations to access your [facility] medical records  [id number]        Your Vitals Were     Pulse Temperature Height Pulse Oximetry BMI (Body Mass Index)       77 97.7  F (36.5  C) (Tympanic) 6' (1.829 m) 99% 28.48 kg/m2        Blood Pressure from Last 3 Encounters:   11/10/18 125/75   09/20/17 129/86   09/03/17  123/80    Weight from Last 3 Encounters:   11/10/18 210 lb (95.3 kg)   09/20/17 211 lb (95.7 kg)   09/03/17 211 lb 12.8 oz (96.1 kg)              Today, you had the following     No orders found for display       Primary Care Provider Office Phone # Fax #    Duane Foy [phone]       [address] [facility] [place] [address]        Equal Access to Services     NICOLE CARMONA : Hadii aad ku hadasho Soomaali, waaxda luqadaha, qaybta kaalmada adeegyada, waxay idiin hayaan adeeg kharash la'aan . So [facility] [phone].    ATENCIÓN: Si habla español, tiene a feng disposición servicios gratuitos de asistencia lingüística. [facility] [phone].    We comply with applicable federal civil rights laws and [place] laws. We do not discriminate on the basis of race, color, national origin, age, disability, sex, sexual orientation, or gender identity.            Thank you!     Thank you for choosing [facility]  for your care. Our goal is always to provide you with excellent care. Hearing back from our patients is one way we can continue to improve our services. Please take a few minutes to complete the written survey that you may receive in the mail after your visit with us. Thank you!             Your Updated Medication List - Protect others around you: Learn how to safely use, store and throw away your medicines at www.disposemymeds.org.          This list is accurate as of 11/10/18  4:35 PM.  Always use your most recent med list.                   Brand Name Dispense Instructions for use Diagnosis    ibuprofen 200 MG tablet    ADVIL/MOTRIN    60 tablet    Take 3 tablets (600 mg) by mouth every 6 hours as needed for mild pain

## 2018-11-10 NOTE — ED AVS SNAPSHOT
[facility], [place], Emergency Department    [address] [place] [address]    Phone:  [phone]                                       Asher Lynn   MRN: [id number]    Department:  [facility], Emergency Department   Date of Visit:  11/10/2018           After Visit Summary Signature Page     I have received my discharge instructions, and my questions have been answered. I have discussed any challenges I see with this plan with the nurse or doctor.    ..........................................................................................................................................  Patient/Patient Representative Signature      ..........................................................................................................................................  Patient Representative Print Name and Relationship to Patient    ..................................................               ................................................  Date                                   Time    ..........................................................................................................................................  Reviewed by Signature/Title    ...................................................              ..............................................  Date                                               Time          22EPIC Rev 08/18

## 2018-11-10 NOTE — ED TRIAGE NOTES
Pt presents ambulatory [facility] from [facility]. Pt c/o abd pain at umbilicus that radiates down abd. Denies nausea, emesis and diarrhea. Denies fevers. Has hx kidney stones requiring ureter stens x 2 last of which was 2 years ago. Pt denies changes urination.

## 2018-11-11 ASSESSMENT — ENCOUNTER SYMPTOMS
CARDIOVASCULAR NEGATIVE: 1
VOMITING: 0
CONSTITUTIONAL NEGATIVE: 1
PSYCHIATRIC NEGATIVE: 1
HEMATOLOGIC/LYMPHATIC NEGATIVE: 1
EYES NEGATIVE: 1
HEMATURIA: 0
ENDOCRINE NEGATIVE: 1
DIFFICULTY URINATING: 0
NEUROLOGICAL NEGATIVE: 1
DIARRHEA: 0
ABDOMINAL PAIN: 1
DYSURIA: 0
MUSCULOSKELETAL NEGATIVE: 1
FREQUENCY: 0
ALLERGIC/IMMUNOLOGIC NEGATIVE: 1
RESPIRATORY NEGATIVE: 1
NAUSEA: 0
CONSTIPATION: 0
FLANK PAIN: 0

## 2018-11-11 NOTE — DISCHARGE INSTRUCTIONS
1.  He was seen in the ED today for evaluation of abdominal pain.  Sensation stable and did not appear to have any acute emergency.  We obtained labs and imaging studies which showed that he did not have any acute infection for appendicitis at this time.  It is likely that your abdominal pain may be as a result of stool burden and anticipate your pain will improve with bowel movement and time.  We gave you some pain medication while in the year which seemed to have helped.  We believe it is safe for you to be discharged with follow-up to her primary care doctor.  We prescribed some pain medications which you can  from the pharmacy.     2.  Please follow-up with your primary care doctor in 1 week.    3.  Please return to the emergency room if he started having worsening or new symptoms.    Please make an appointment to follow up with Your Primary Care Provider in 5-7 days even if entirely better.

## 2018-11-11 NOTE — ED PROVIDER NOTES
History     Chief Complaint   Patient presents with     Abdominal Pain     HPI  Asher Lynn is a 23 year old male who presents with a chief complaint of periumbilical pain.  Patient reports that his pain started earlier this morning. It was initially periumbilical however it is now radiating to right lower quadrant of his abdomen and it has progressively worsened in intensity. His pain is worse with movement especially walking and getting up from a sitting position.  He reports that he had dinner last night at a  [employer] restaurant and he shared the meal with his fiancé who is not experiencing any symptoms. He reports that his pain is 6/10 without movement but 8-9/10 with movement. He endorses bloating.  He does not think he is constipated as he had a bowel movement earlier today. He does not have a history of similar symptoms in the past and can not think of a cause for it. He denies any chest pain, fever, chills, rhinorrhea ,URI symptoms, cough, nausea, vomiting, diarrhea, dysuria, hematochezia/melena, or hematuria.        Social: Patient presented accompanied by his fiance who was at the bedside.    I have reviewed the Medications, Allergies, Past Medical and Surgical History, and Social History in the Epic system.    Review of Systems   Constitutional: Negative.    HENT: Negative.    Eyes: Negative.    Respiratory: Negative.    Cardiovascular: Negative.    Gastrointestinal: Positive for abdominal pain. Negative for constipation, diarrhea, nausea and vomiting.   Endocrine: Negative.    Genitourinary: Negative for decreased urine volume, difficulty urinating, discharge, dysuria, flank pain, frequency, hematuria, penile pain, penile swelling and urgency.   Musculoskeletal: Negative.    Skin: Negative.    Allergic/Immunologic: Negative.    Neurological: Negative.    Hematological: Negative.    Psychiatric/Behavioral: Negative.        Physical Exam   BP: 125/67  Pulse: 78  Temp: 98.1  F (36.7   C)  Resp: 16  Weight: 98.6 kg (217 lb 6 oz)  SpO2: 98 %      Physical Exam   Constitutional: He is oriented to person, place, and time. He appears well-developed and well-nourished.   Eyes: EOM are normal.   Neck: Normal range of motion. Neck supple.   Cardiovascular: Normal rate, regular rhythm and normal heart sounds.    Pulmonary/Chest: Effort normal and breath sounds normal.   Abdominal: Soft. Bowel sounds are normal. He exhibits no distension and no mass. There is tenderness. There is no rebound and no guarding.   Tenderness on moderate palpation of the periumbilical and right lower quadrant. Negative Rovsing sign. Positive McBurney's sign. No epigastric or costovertebral tenderness tenderness ellicited   Musculoskeletal: Normal range of motion.   Neurological: He is alert and oriented to person, place, and time.   Skin: Skin is warm and dry.   Psychiatric: He has a normal mood and affect.       ED Course     ED Course     Procedures        {EKG done?: Not indicated    Critical Care time:  none        Results for orders placed or performed during the hospital encounter of 11/10/18 (from the past 24 hour(s))   CT Abdomen Pelvis w Contrast    Narrative    EXAMINATION: CT ABDOMEN PELVIS W CONTRAST, 11/10/2018 9:40 PM    TECHNIQUE:  Helical CT images from the lung bases through the  symphysis pubis but no tolerated obtained plane. At that lobulated  cystic lesion of indeterminate etiology of the colon before anything  smaller is     IV contrast. Contrast dose: 132cc of Isovue 370    COMPARISON: 2/10/2011    HISTORY: RLQ pain;     FINDINGS:  LIVER: No focal enhancing hepatic masses.    BILIARY: No cholelithiasis. No intra or extrahepatic biliary ductal  dilatation.    PANCREAS: No focal enhancing pancreatic lesion. No pancreatic ductal  dilatation.    SPLEEN: Hypoattenuating lesion in the superior portion of the spleen  measuring 1.2 x 1.4 cm, significantly decreased in size compared to  2011, likely representing  splenic cyst.    ADRENAL GLANDS: Within normal limits.    GENITOURINARY TRACT: The kidneys are symmetrically enhancing. Punctate  nonobstructing renal calculi bilaterally. No hydronephrosis or  hydroureter. No ureteral or bladder calculi. Bladder is partially  distended and unremarkable. Prostate gland normal limits.    GASTROINTESTINAL TRACT: No dilated loops of bowel. The appendix  measures 6 mm at the tip with scattered foci of air within the lumen.  No appendicolith. No periappendiceal fluid or fat stranding to suggest  acute appendicitis. Moderate stool burden.    PERITONEUM/FLUID: No free air or free fluid.    VESSELS: Normal caliber of the abdominal aorta.  The portal, splenic,  and superior mesenteric veins are patent.     LYMPH NODES: No enlarged lymph nodes.    LUNG BASES: Dependent atelectasis. No focal pulmonary opacities. No  pleural effusion. Heart size is within normal limits. No pericardial  effusion.    BONES/SOFT TISSUES: No aggressive osseous lesions.      Impression    IMPRESSION:   1. No acute findings in the abdomen to explain patient's symptoms.  Specifically, there are no findings to suggest acute appendicitis.  2. Moderate colonic stool burden.   3. Nonobstructing bilateral renal calculi.  4. Splenic cyst, decreased in size compared to prior.    I have personally reviewed the examination and initial interpretation  and I agree with the findings.    LISSET MARIN MD           Assessments & Plan (with Medical Decision Making)   Patient is an otherwise healthy 23-year-old who presents with a chief complaint of acute onset abdominal pain which started early on the day of presentation.  On presentation patient did not appear septic and was hemodynamically stable.  His physical exam was significant for localized abdominal tenderness in the periumbilical and right lower quadrant.  Initial differential included early stage appendicitis, vs. gastroenteritis, vs. UTI. Constipation was also  worth considering though it would be an unusual presentation as patient reported that he had a bowel movement earlier today.  Initial workup in the ED included CBC, and BMP were unremarkable with no leukocytosis or electrolyte abnormalities. UA was bland. He was given IL IVF and some pain medications (initially 0.2mg dilaudid, 30mg toradol) with some pain relief. CT abdomen and pelvis was otherwise unremarkable with no signs of acute appendicitis but it did show some moderate stool burden which may be contributing to patient's symptoms. Patient's overall picture was not concerning for an acute infection or an emergent intraabdominal process and he was deemed safe enough for discharge. The lab and imaging results were discussed with the patient and he was in agreement. Prior to discharge, he was given a prescription for tylenol for pain and Simethicone for abdominal discomfort/ bloating. He was instructed to follow up with his primary care doctor and to return to the ED should his symptoms worsen despite treatement.       I have reviewed the nursing notes.    I have reviewed the findings, diagnosis, plan and need for follow up with the patient.    Discharge Medication List as of 11/10/2018 10:56 PM      START taking these medications    Details   acetaminophen (TYLENOL) 500 MG tablet Take 2 tablets (1,000 mg) by mouth every 6 hours as needed for pain or fever Do not take more than 4g (8 tablets) in 24hrs, Disp-60 tablet, R-0, Local Print      Simethicone 125 MG CAPS Take 125 mg by mouth every 6 hours as needed (For abdominal discomfort), Disp-28 capsule, R-0, Local Print             Final diagnoses:   Abdominal pain, right lower quadrant         Divina Deluca MD, PhD  Internal Medicine PGY1  [phone]    11/10/2018   [facility], EMERGENCY DEPARTMENT    This data collected with the Resident working in the Emergency Department.  Patient was seen and evaluated by myself and I repeated the history and  physical exam with the patient.  The plan of care was discussed with them.  The key portions of the note including the entire assessment and plan reflect my documentation.          Hilda Bedolla MD  11/11/18 5931

## 2018-11-12 ENCOUNTER — OFFICE VISIT (OUTPATIENT)
Dept: FAMILY MEDICINE | Facility: CLINIC | Age: 23
End: 2018-11-12
Payer: COMMERCIAL

## 2018-11-12 VITALS
OXYGEN SATURATION: 97 % | BODY MASS INDEX: 29.43 KG/M2 | HEART RATE: 78 BPM | SYSTOLIC BLOOD PRESSURE: 135 MMHG | WEIGHT: 217 LBS | TEMPERATURE: 99 F | DIASTOLIC BLOOD PRESSURE: 76 MMHG

## 2018-11-12 DIAGNOSIS — N20.0 NEPHROLITHIASIS: ICD-10-CM

## 2018-11-12 DIAGNOSIS — D73.4 SPLENIC CYST: ICD-10-CM

## 2018-11-12 DIAGNOSIS — Z23 NEED FOR PROPHYLACTIC VACCINATION AND INOCULATION AGAINST INFLUENZA: ICD-10-CM

## 2018-11-12 DIAGNOSIS — R10.84 ABDOMINAL PAIN, GENERALIZED: Primary | ICD-10-CM

## 2018-11-12 PROCEDURE — 90471 IMMUNIZATION ADMIN: CPT | Performed by: FAMILY MEDICINE

## 2018-11-12 PROCEDURE — 99214 OFFICE O/P EST MOD 30 MIN: CPT | Mod: 25 | Performed by: FAMILY MEDICINE

## 2018-11-12 PROCEDURE — 90686 IIV4 VACC NO PRSV 0.5 ML IM: CPT | Performed by: FAMILY MEDICINE

## 2018-11-12 NOTE — MR AVS SNAPSHOT
After Visit Summary   11/12/2018    Asher Lynn    MRN: [id number]           Patient Information     Date Of Birth          1995        Visit Information        Provider Department      11/12/2018 2:00 PM Duane Foy MD [facility]        Today's Diagnoses     Abdominal pain, generalized    -  1    Nephrolithiasis        Splenic cyst        Need for prophylactic vaccination and inoculation against influenza           Follow-ups after your visit        Follow-up notes from your care team     Return if symptoms worsen or fail to improve.      Who to contact     If you have questions or need follow up information about today's clinic visit or your schedule please contact [facility] directly at [phone].  Normal or non-critical lab and imaging results will be communicated to you by MyChart, letter or phone within 4 business days after the clinic has received the results. If you do not hear from us within 7 days, please contact the clinic through MyChart or phone. If you have a critical or abnormal lab result, we will notify you by phone as soon as possible.  Submit refill requests through [facility] or call your pharmacy and they will forward the refill request to us. Please allow 3 business days for your refill to be completed.          Additional Information About Your Visit        MyChart Information     [facility] gives you secure access to your electronic health record. If you see a primary care provider, you can also send messages to your care team and make appointments. If you have questions, please call your primary care clinic.  If you do not have a primary care provider, please call [phone] and they will assist you.        Care EveryWhere ID     This is your Care EveryWhere ID. This could be used by other organizations to access your [facility] medical records  [id number]        Your Vitals Were     Pulse Temperature Pulse  Oximetry BMI (Body Mass Index)          78 99  F (37.2  C) (Oral) 97% 29.43 kg/m2         Blood Pressure from Last 3 Encounters:   11/12/18 135/76   11/10/18 122/62   11/10/18 125/75    Weight from Last 3 Encounters:   11/12/18 217 lb (98.4 kg)   11/10/18 217 lb 6 oz (98.6 kg)   11/10/18 210 lb (95.3 kg)              We Performed the Following     FLU VACCINE, SPLIT VIRUS, IM (QUADRIVALENT) [67293]- >3 YRS     Vaccine Administration, Initial [66217]        Primary Care Provider Office Phone # Fax #    Duane Foy [phone]       [address] [facility] [address]        Equal Access to Services     NICOLE CARMONA : Osvaldo ambrose Soliam, waaxda luqadaha, qaybta kaalmada latonyayabritney, villa mata . So [facility] [phone].    ATENCIÓN: Si habla español, tiene a feng disposición servicios gratuitos de asistencia lingüística. Llame al [phone].    We comply with applicable federal civil rights laws and [place] laws. We do not discriminate on the basis of race, color, national origin, age, disability, sex, sexual orientation, or gender identity.            Thank you!     Thank you for choosing [facility]  for your care. Our goal is always to provide you with excellent care. Hearing back from our patients is one way we can continue to improve our services. Please take a few minutes to complete the written survey that you may receive in the mail after your visit with us. Thank you!             Your Updated Medication List - Protect others around you: Learn how to safely use, store and throw away your medicines at www.disposemymeds.org.          This list is accurate as of 11/12/18  2:37 PM.  Always use your most recent med list.                   Brand Name Dispense Instructions for use Diagnosis    acetaminophen 500 MG tablet    TYLENOL    60 tablet    Take 2 tablets (1,000 mg) by mouth every 6 hours as needed for pain or fever Do  not take more than 4g (8 tablets) in 24hrs        ibuprofen 200 MG tablet    ADVIL/MOTRIN    60 tablet    Take 3 tablets (600 mg) by mouth every 6 hours as needed for mild pain        Simethicone 125 MG Caps     28 capsule    Take 125 mg by mouth every 6 hours as needed (For abdominal discomfort)

## 2018-11-12 NOTE — PROGRESS NOTES
"  SUBJECTIVE:   Asher Lynn is a 23 year old male who presents to clinic today for the following health issues:      ED/UC Followup:    [facility]   Date of visit: 11/10/18  Reason for visit: Abdominal pain, right lower quadrant  Current Status: Still has pain and has some questions       He was having abdominal pain a couple of days ago that became quite uncomfortable.  It started in the periumbilical region and then was migrating to the right lower quadrant.  He went to an urgent care and they were concerned about appendicitis, so they had him go to the ER.  He had a CT scan done there which was negative for acute appendicitis.  He did have some \"punctate\" kidney stones and a splenic cyst, which she has been known to have in the past.  The splenic cyst was smaller in size than previously.  He was also noted to have a \"moderate stool burden\".  The patient has been having regular bowel movements since his ER visit 2 days ago.  His pain is eased up somewhat, but it has not cleared.  He is not aware of any trauma to the abdomen or blood in his urine or other obvious causes for the abdominal discomfort.    Problem list and histories reviewed & adjusted, as indicated.  Additional history: as documented    Patient Active Problem List   Diagnosis     Shoulder impingement - right     Glenoid labrum tear - right     Nephrolithiasis     Splenic cyst     Past Surgical History:   Procedure Laterality Date     ENDOSCOPY  3-20-15     IR URETERAL STENT PLACEMENT LEFT  2/29/16    at [facility]     ORIF left hand fracture  3/19/2010    4th metacarpal fracture     ORTHOPEDIC SURGERY      sternum and L clavicle surgery     repair of right great toe torn ligament Right 1/10/14    Dr. James     rerpair of left sternoclavicular dislocation  08/2016     SURGICAL HISTORY OF -   5/26/2010    removal of hardware from 4th metacarpal fx       Social History   Substance Use Topics     Smoking status: Never Smoker     " Smokeless tobacco: Never Used     Alcohol use 0.0 oz/week     0 Standard drinks or equivalent per week     Family History   Problem Relation Age of Onset     Hypertension Mother      Glaucoma No family hx of      Macular Degeneration No family hx of      Cancer No family hx of      Cerebrovascular Disease No family hx of      Thyroid Disease No family hx of          Current Outpatient Prescriptions   Medication Sig Dispense Refill     acetaminophen (TYLENOL) 500 MG tablet Take 2 tablets (1,000 mg) by mouth every 6 hours as needed for pain or fever Do not take more than 4g (8 tablets) in 24hrs 60 tablet 0     ibuprofen (ADVIL/MOTRIN) 200 MG tablet Take 3 tablets (600 mg) by mouth every 6 hours as needed for mild pain (Patient not taking: Reported on 11/10/2018) 60 tablet 0     Simethicone 125 MG CAPS Take 125 mg by mouth every 6 hours as needed (For abdominal discomfort) (Patient not taking: Reported on 11/12/2018) 28 capsule 0     Allergies   Allergen Reactions     Latex Anaphylaxis and Hives     With a balloon     Morphine Hives       Reviewed and updated as needed this visit by clinical staff  Tobacco  Allergies  Meds  Med Hx  Surg Hx  Fam Hx  Soc Hx      Reviewed and updated as needed this visit by Provider         ROS:  Constitutional, HEENT, cardiovascular, pulmonary, gi and gu systems are negative, except as otherwise noted.    OBJECTIVE:     /76 (BP Location: Right arm, Patient Position: Chair, Cuff Size: Adult Regular)  Pulse 78  Temp 99  F (37.2  C) (Oral)  Wt 217 lb (98.4 kg)  SpO2 97%  BMI 29.43 kg/m2  Body mass index is 29.43 kg/(m^2).  GENERAL: healthy, alert and no distress  ABDOMEN: Soft with mild periumbilical and right lower quadrant discomfort, but also mild discomfort in the upper mid abdomen and left upper quadrant as well.  No guarding or rebound or mass.  BACK: No specific CVAT, but he does feel very mild discomfort in the right flank region    Diagnostic Test Results:  Results  for orders placed or performed during the hospital encounter of 11/10/18   CT Abdomen Pelvis w Contrast    Narrative    EXAMINATION: CT ABDOMEN PELVIS W CONTRAST, 11/10/2018 9:40 PM    TECHNIQUE:  Helical CT images from the lung bases through the  symphysis pubis but no tolerated obtained plane. At that lobulated  cystic lesion of indeterminate etiology of the colon before anything  smaller is     IV contrast. Contrast dose: 132cc of Isovue 370    COMPARISON: 2/10/2011    HISTORY: RLQ pain;     FINDINGS:  LIVER: No focal enhancing hepatic masses.    BILIARY: No cholelithiasis. No intra or extrahepatic biliary ductal  dilatation.    PANCREAS: No focal enhancing pancreatic lesion. No pancreatic ductal  dilatation.    SPLEEN: Hypoattenuating lesion in the superior portion of the spleen  measuring 1.2 x 1.4 cm, significantly decreased in size compared to  2011, likely representing splenic cyst.    ADRENAL GLANDS: Within normal limits.    GENITOURINARY TRACT: The kidneys are symmetrically enhancing. Punctate  nonobstructing renal calculi bilaterally. No hydronephrosis or  hydroureter. No ureteral or bladder calculi. Bladder is partially  distended and unremarkable. Prostate gland normal limits.    GASTROINTESTINAL TRACT: No dilated loops of bowel. The appendix  measures 6 mm at the tip with scattered foci of air within the lumen.  No appendicolith. No periappendiceal fluid or fat stranding to suggest  acute appendicitis. Moderate stool burden.    PERITONEUM/FLUID: No free air or free fluid.    VESSELS: Normal caliber of the abdominal aorta.  The portal, splenic,  and superior mesenteric veins are patent.     LYMPH NODES: No enlarged lymph nodes.    LUNG BASES: Dependent atelectasis. No focal pulmonary opacities. No  pleural effusion. Heart size is within normal limits. No pericardial  effusion.    BONES/SOFT TISSUES: No aggressive osseous lesions.      Impression    IMPRESSION:   1. No acute findings in the abdomen to  explain patient's symptoms.  Specifically, there are no findings to suggest acute appendicitis.  2. Moderate colonic stool burden.   3. Nonobstructing bilateral renal calculi.  4. Splenic cyst, decreased in size compared to prior.    I have personally reviewed the examination and initial interpretation  and I agree with the findings.    LISSET MARIN MD         ASSESSMENT/PLAN:         ICD-10-CM    1. Abdominal pain, generalized R10.84    2. Nephrolithiasis N20.0    3. Splenic cyst D73.4    4. Need for prophylactic vaccination and inoculation against influenza Z23 FLU VACCINE, SPLIT VIRUS, IM (QUADRIVALENT) [04417]- >3 YRS     Vaccine Administration, Initial [78302]     He has had some recent abdominal pain without evidence for appendicitis or other acute pathology to account for it  He may be having some discomfort on the basis of constipation and we discussed having him drink plenty of fluids and getting plenty of fiber to deal with that  He does have known nephrolithiasis and a splenic cyst, but those are likely asymptomatic  We will plan to follow his abdominal pain expectantly  We will give him a flu shot today    If new, worsening or persistent symptoms, the patient is to call or return for a recheck.      Duane Foy MD  [facility]    Injectable Influenza Immunization Documentation    1.  Is the person to be vaccinated sick today?   No    2. Does the person to be vaccinated have an allergy to a component   of the vaccine?   No  Egg Allergy Algorithm Link    3. Has the person to be vaccinated ever had a serious reaction   to influenza vaccine in the past?   No    4. Has the person to be vaccinated ever had Guillain-Barré syndrome?   No    Form completed by Patient  Vaccine information supplied.  Due to injection administration, patient instructed to remain in clinic for 15 minutes  afterwards, and to report any adverse reaction to me immediately.  Katelin Islas CMA

## 2018-11-15 ENCOUNTER — MYC MEDICAL ADVICE (OUTPATIENT)
Dept: FAMILY MEDICINE | Facility: CLINIC | Age: 23
End: 2018-11-15

## 2018-11-29 ENCOUNTER — MYC MEDICAL ADVICE (OUTPATIENT)
Dept: FAMILY MEDICINE | Facility: CLINIC | Age: 23
End: 2018-11-29

## 2018-11-29 DIAGNOSIS — R10.13 ABDOMINAL PAIN, EPIGASTRIC: Primary | ICD-10-CM

## 2018-11-29 NOTE — TELEPHONE ENCOUNTER
"Patient was seen by Dr. Foy 11/12/18, was in ER 11/10/18.    See plan at 11/12/18 visit:     If new, worsening or persistent symptoms, the patient is to call or return for a recheck.    I do see Dr. Foy advised patient \"keep him posted\" in [employer] exchange 11/15/18.    Routed patient's [place] update to Dr. Foy to address.    Dorita Tinsley RN  [facility]      "

## 2019-04-13 ENCOUNTER — MYC MEDICAL ADVICE (OUTPATIENT)
Dept: FAMILY MEDICINE | Facility: CLINIC | Age: 24
End: 2019-04-13

## 2019-04-15 NOTE — TELEPHONE ENCOUNTER
Routing to PCP to review and advise.    Please see My Chart message.      Lottie Ron RN  [facility]

## 2019-04-24 ENCOUNTER — OFFICE VISIT (OUTPATIENT)
Dept: FAMILY MEDICINE | Facility: CLINIC | Age: 24
End: 2019-04-24
Payer: COMMERCIAL

## 2019-04-24 VITALS
DIASTOLIC BLOOD PRESSURE: 75 MMHG | TEMPERATURE: 98.7 F | HEIGHT: 71 IN | BODY MASS INDEX: 31.36 KG/M2 | HEART RATE: 75 BPM | OXYGEN SATURATION: 97 % | WEIGHT: 224 LBS | SYSTOLIC BLOOD PRESSURE: 127 MMHG

## 2019-04-24 DIAGNOSIS — B35.6 TINEA CRURIS: ICD-10-CM

## 2019-04-24 DIAGNOSIS — M25.511 RIGHT SHOULDER PAIN, UNSPECIFIED CHRONICITY: Primary | ICD-10-CM

## 2019-04-24 PROCEDURE — 99214 OFFICE O/P EST MOD 30 MIN: CPT | Performed by: FAMILY MEDICINE

## 2019-04-24 ASSESSMENT — MIFFLIN-ST. JEOR: SCORE: 2024.19

## 2019-04-24 ASSESSMENT — PAIN SCALES - GENERAL: PAINLEVEL: MODERATE PAIN (5)

## 2019-04-24 NOTE — PROGRESS NOTES
SUBJECTIVE:   Asher Lynn is a 23 year old male who presents to clinic today for the following   health issues:      Joint Pain    Onset: Since the surgery    Description:   Location: right shoulder  Character: Dull ache    Intensity: 5/10 when using and 0/10 at rest    Progression of Symptoms: same    Accompanying Signs & Symptoms:  Other symptoms: none    History:   Previous similar pain: YES      Precipitating factors:   Trauma or overuse: YES    Alleviating factors:  Improved by: chiropractor    Therapies Tried and outcome: Chiropractor. Looking for other options.    He has a long history of shoulder issues including various injuries, shoulder impingement, and a glenoid labrum tear.  He had arthroscopic surgical treatment for his labral tear in the fall 2017 by Dr. Narayan.  He went through postoperative physical therapy for a number of months and was doing fairly well through last summer.  In the fall he stopped having formal physical therapy.  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  He is been having some intermittent right shoulder pain and in recent months.  He is been seen by a physical therapist and chiropractor/ for his football team and they feel like he is having biceps and triceps tendon issues.  He was wondering if a cortisone injection might be helpful.    He is also had a rash in the groin area and between his buttocks.  It is itchy.  He admits he gets hot and sweaty quite a bit.  He wonders what he could do for the rash.    Additional history: as documented    Reviewed  and updated as needed this visit by clinical staff  Tobacco  Allergies  Meds  Med Hx  Surg Hx  Fam Hx  Soc Hx        Reviewed and updated as needed this visit by Provider         Patient Active Problem List   Diagnosis     Shoulder impingement - right     Glenoid labrum tear - right     Nephrolithiasis      "Splenic cyst     Past Surgical History:   Procedure Laterality Date     Arthroscopic right shoulder labral repair  10/2017    Dr. Narayan     ENDOSCOPY  3-20-15     IR URETERAL STENT PLACEMENT LEFT  2/29/16    at [facility]     ORIF left hand fracture  3/19/2010    4th metacarpal fracture     ORTHOPEDIC SURGERY      sternum and L clavicle surgery     repair of right great toe torn ligament Right 1/10/14    Dr. James     rerpair of left sternoclavicular dislocation  08/2016     SURGICAL HISTORY OF -   5/26/2010    removal of hardware from 4th metacarpal fx       Social History     Tobacco Use     Smoking status: Never Smoker     Smokeless tobacco: Never Used   Substance Use Topics     Alcohol use: Yes     Alcohol/week: 0.0 oz     Family History   Problem Relation Age of Onset     Hypertension Mother      Glaucoma No family hx of      Macular Degeneration No family hx of      Cancer No family hx of      Cerebrovascular Disease No family hx of      Thyroid Disease No family hx of          Current Outpatient Medications   Medication Sig Dispense Refill     ibuprofen (ADVIL/MOTRIN) 200 MG tablet Take 3 tablets (600 mg) by mouth every 6 hours as needed for mild pain 60 tablet 0     Simethicone 125 MG CAPS Take 125 mg by mouth every 6 hours as needed (For abdominal discomfort) (Patient not taking: Reported on 11/12/2018) 28 capsule 0     Allergies   Allergen Reactions     Latex Anaphylaxis and Hives     With a balloon     Morphine Hives       ROS:  Constitutional, HEENT, cardiovascular, pulmonary, gi and gu systems are negative, except as otherwise noted.    OBJECTIVE:     /75 (BP Location: Right arm, Patient Position: Chair, Cuff Size: Adult Regular)   Pulse 75   Temp 98.7  F (37.1  C) (Oral)   Ht 1.805 m (5' 11.06\")   Wt 101.6 kg (224 lb)   SpO2 97%   BMI 31.19 kg/m    Body mass index is 31.19 kg/m .  GENERAL: healthy, alert and no distress  MS: He has full range of motion of his right shoulder without any " significant pain.  He has no impingement signs today.  He can abduct his right arm up overhead without difficulty.  He can reach his right arm behind his back without pain.  He has a negative supraspinatus empty can test.  He has no pain with resistance to biceps or triceps function today.  SKIN: He has an erythematous scaly rash with slightly raised red borders in the intergluteal region between his buttocks and extending out into the medial buttocks bilaterally, and also to a lesser extent in the inguinal area.    Diagnostic Test Results:  His shoulder MRI results from 2017 were reviewed as well as orthopedic notes by Dr. Narayan    ASSESSMENT/PLAN:       ICD-10-CM    1. Right shoulder pain, unspecified chronicity M25.511    2. Tinea cruris B35.6      He does not appear to be having shoulder impingement or rotator cuff issues currently with the right shoulder  I suggested he work with his physical therapist/chiropractor/ with connections for stretching and strengthening exercises for the right shoulder  If symptoms persist, then follow-up with Dr. Narayan for further evaluation    He does appear to have tinea of the intergluteal region and in the groin region  I recommended keeping the skin clean and dry and using 1% Chlortrimazole cream twice a day for a couple of weeks or so to clear the rash    If new, worsening or persistent symptoms, the patient is to call or return for a recheck.      Duane Foy MD  [facility]

## 2020-04-13 ENCOUNTER — TELEPHONE (OUTPATIENT)
Dept: PODIATRY | Facility: CLINIC | Age: 25
End: 2020-04-13

## 2020-04-13 NOTE — TELEPHONE ENCOUNTER
LVM for return call to discuss scheduling an appointment. Name on the Voicemail box was for a female. I left a generic message requesting patient to call back to discuss scheduling. .     Jolly Callaway, ATC

## 2020-04-13 NOTE — TELEPHONE ENCOUNTER
Reason for call:  Other   Patient called regarding (reason for call): appointment  Additional comments: Pt will be new to , and has a fungal infection on his foot that he has tried to remedy without success, and it has progressed. He would like to get it looked at and hopefully treated. Please call to discuss, and schedule if possible.     Phone number to reach patient:  Cell number on file:    Telephone Information:   Mobile [phone]       Best Time:  Any    Can we leave a detailed message on this number?  NO    Travel screening: Not Applicable

## 2020-04-23 ENCOUNTER — VIRTUAL VISIT (OUTPATIENT)
Dept: PODIATRY | Facility: CLINIC | Age: 25
End: 2020-04-23
Payer: COMMERCIAL

## 2020-04-23 VITALS — HEIGHT: 72 IN | WEIGHT: 220 LBS | BODY MASS INDEX: 29.8 KG/M2

## 2020-04-23 DIAGNOSIS — B35.1 ONYCHOMYCOSIS: ICD-10-CM

## 2020-04-23 DIAGNOSIS — L60.8 CHANGE IN NAIL APPEARANCE: Primary | ICD-10-CM

## 2020-04-23 DIAGNOSIS — M20.12 HALLUX VALGUS OF LEFT FOOT: ICD-10-CM

## 2020-04-23 PROCEDURE — 99203 OFFICE O/P NEW LOW 30 MIN: CPT | Mod: 95 | Performed by: PODIATRIST

## 2020-04-23 ASSESSMENT — MIFFLIN-ST. JEOR: SCORE: 2015.91

## 2020-04-23 NOTE — PROGRESS NOTES
"Asher Lynn is a 24 year old male who is being evaluated via a billable video visit.      The patient has been notified of following:     \"This video visit will be conducted via a call between you and your physician/provider. We have found that certain health care needs can be provided without the need for an in-person physical exam.  This service lets us provide the care you need with a video conversation.  If a prescription is necessary we can send it directly to your pharmacy.  If lab work is needed we can place an order for that and you can then stop by our lab to have the test done at a later time.    Video visits are billed at different rates depending on your insurance coverage.  Please reach out to your insurance provider with any questions.    If during the course of the call the physician/provider feels a video visit is not appropriate, you will not be charged for this service.\"    Patient has given verbal consent for Video visit? Yes    How would you like to obtain your AVS? Maki    Patient would like the video invitation sent by: Text to cell phone: [phone]    Will anyone else be joining your video visit? No      Roznia Park CMA  Podiatry / Foot & Ankle Surgery  [facility]    ________________________________  Video-Visit Details    Type of service:  Video Visit    Video Start Time: 1330  Video End Time: 1345    Originating Location (pt. Location): Home    Distant Location (provider location):  [facility]     Mode of Communication:  Video Conference via [facility]      ASSESSMENT/PLAN:  Encounter Diagnoses   Name Primary?     Change in nail appearance Yes     Onychomycosis      Hallux valgus of left foot        I explained that toenail changes are often from injury to a nail unit, rather than from fungus.  Injury might be a one-time event or from repetitive irritation in foot wear and with certain types of activities.  Injured nails are likely more " "susceptible to fungal infection.  Change seen in multiple nails is more likely from fungus.  Treatment options are limited.     It was explained that many people opt to simply keep the involved nails trimmed and filed. [facility] does not offer this service.  Another option is a trial of a topical and/or oral antifungal.  Many times these are not successful nor provide a cure.  These medications do not correct a nail deformity.      Permanent removal of deformed toenails is an option.      The patient opted to try oral terbinafine. He denies taking other medications other than PRN ibuprofen. He does not use alcohol beyond moderate consumption.     Although not symptomatic, he did want to discuss bunion deformities.  We discussed how it typically is inherited.  If symptomatic at some point, I recommend he try stiffer soled shoes and good arch support.  We included information on bunions in his after visit summary.      Body mass index is 29.84 kg/m .    Weight management plan: Patient was referred to their PCP to discuss a diet and exercise plan.      Enrique Cosme DPM, FACFAS, MS    [facility]      ____________________________________________________________________    HPI:          Chief Complaint: toenail fungus  Onset of problem: \"a few months\"  No related pain  Previous treatment: over-the-counter preparations - no change  He describes a history of being active in sports wearing cleats.    He also reports having history of an injury to the right first metatarsal phalangeal joint area and some form of surgery.  Reports having a bunion on his left foot.  Neither of these joints cause him pain.    *  Patient Active Problem List   Diagnosis     Shoulder impingement - right     Glenoid labrum tear - right     Nephrolithiasis     Splenic cyst   *  *  Past Surgical History:   Procedure Laterality Date     Arthroscopic right shoulder labral repair  10/2017    Dr. Narayan "     ENDOSCOPY  3-20-15     IR URETERAL STENT PLACEMENT LEFT  2/29/16    at [facility]     ORIF left hand fracture  3/19/2010    4th metacarpal fracture     ORTHOPEDIC SURGERY      sternum and L clavicle surgery     repair of right great toe torn ligament Right 1/10/14    Dr. James     rerpair of left sternoclavicular dislocation  08/2016     SURGICAL HISTORY OF -   5/26/2010    removal of hardware from 4th metacarpal fx   *  *  Current Outpatient Medications   Medication Sig Dispense Refill     ibuprofen (ADVIL/MOTRIN) 200 MG tablet Take 3 tablets (600 mg) by mouth every 6 hours as needed for mild pain (Patient not taking: Reported on 4/23/2020) 60 tablet 0       ROS:     A 10-point review of systems was performed and is positive for that noted above in the HPI and as seen below.  All other areas are negative.     Numbness in feet?  no   Calf pain with walking? no  Recent foot/ankle injury? no  Weight change over past 12 months? no  Self perception as overweight? no  Recent flu-like symptoms? no  Joint pain other than feet ? no    Social History:   Social History     Socioeconomic History     Marital status: Single     Spouse name: Not on file     Number of children: Not on file     Years of education: Not on file     Highest education level: Not on file   Occupational History     Not on file   Social Needs     Financial resource strain: Not on file     Food insecurity     Worry: Not on file     Inability: Not on file     Transportation needs     Medical: Not on file     Non-medical: Not on file   Tobacco Use     Smoking status: Never Smoker     Smokeless tobacco: Never Used   Substance and Sexual Activity     Alcohol use: Yes     Alcohol/week: 0.0 standard drinks     Drug use: No     Sexual activity: Not Currently     Partners: Female   Lifestyle     Physical activity     Days per week: Not on file     Minutes per session: Not on file     Stress: Not on file   Relationships     Social connections     Talks on  "phone: Not on file     Gets together: Not on file     Attends [employer] service: Not on file     Active member of club or organization: Not on file     Attends meetings of clubs or organizations: Not on file     Relationship status: Not on file     Intimate partner violence     Fear of current or ex partner: Not on file     Emotionally abused: Not on file     Physically abused: Not on file     Forced sexual activity: Not on file   Other Topics Concern     Parent/sibling w/ CABG, MI or angioplasty before 65F 55M? No   Social History Narrative     Not on file       Family history:  Family History   Problem Relation Age of Onset     Hypertension Mother      Glaucoma No family hx of      Macular Degeneration No family hx of      Cancer No family hx of      Cerebrovascular Disease No family hx of      Thyroid Disease No family hx of        EXAM:    Vitals: Ht 1.829 m (6')   Wt 99.8 kg (220 lb)   BMI 29.84 kg/m    BMI: Body mass index is 29.84 kg/m .  Height: 6' 0\"[stated[    Constitutional/ general:  Pt is in no apparent distress, appears well-nourished.  Cooperative with history and physical exam.     Vascular:  Skin appears well perfused to both feet.      Neuro:  Alert and oriented x 3. Coordinated gait.  Light touch sensation is intact to the L4, L5, S1 distributions. No obvious deficits.  No evidence of neurological-based weakness, spasticity, or contracture in the lower extremities.     Derm: Normal texture and turgor.  No erythema, ecchymosis, or cyanosis.  No open lesions.   Bilateral hallux nails show some mild thickening, discoloration. There is some dystrophia of the left hallux nail. Multiple other bilateral nails show mild changes.     Musculoskeletal:    Lower extremity muscle strength is normal.  Patient is ambulatory without an assistive device or brace .  No gross deformities.  Flatter - to -rectus foot structure. Mild hallux abducto valgus, left foot.         "

## 2020-04-23 NOTE — PATIENT INSTRUCTIONS
[facility]  [address]  [place] [address]  Phone: [phone]  Fax: [phone]    NAIL FUNGUS / ONYCHOMYCOSIS   Nail fungus is not a hygiene problem and will likely not lead to significant medical problems. The nails may get thick causing pain and possibly local skin infection. Treatments include debridement (trimming), oral antifungals, topical antifungals and complete removal of the nail. Most fungal nails are not treated.     Topicals such as tea tree oil can be helpful for surface fungus and may, at best, limit progression. Over the counter creams (such as Lamisil) can also be used however, their effectiveness is also quite low.  Topical treatment with Pen lac is expensive and often not covered by insurance. Pen lac has an approximate 8% success rate. Topical therapy recommendations is to apply twice a day for at least 3-4 months as it takes 9 months for new nail to grow out.     Experts suggest soaking your feet for 15 to 20 minutes in a mixture of 1 cup vinegar to 4 cups warm water. Be sure to rinse well and pat your feet dry when you're done. You can soak your feet like this daily. But if your skin becomes irritated, try soaking only two to three times a week. Vicks VapoRub, as with vinegar, there have been no controlled clinical trials to assess the effectiveness of Vicks VapoRub on nail fungus, but there have been numerous anecdotal reports that it works. There's no consensus on how often to apply this product, so check with your doctor before using it on your nails.      Oral therapies include Sporanox and Lamisil. Oral therapies are also expensive and not very effective. Side effects such as liver disease are the main concern. Return of fungus is common even if the treatment worked.      Other Tips:  - Penlac nail medication apply daily x 4 months; remove old polish first day of each week  - Antifungal cream/powder (Zeasorb) - apply daily to feet and shoes x 2  months  - Clean shoes with Lysol or in washing machine every few weeks  - Rotate shoe gear; give them 24 hours to dry out between days wearing them  - Clean pair of socks in morning, clean pair in afternoon if your feet sweat  - Shower shoes used in public showers/pools           BUNION (HALLUX ABDUCTO VALGUS)  A bunion is caused by muscle imbalance. The great toe is pulled toward the smaller toes. The metatarsal head is pushed outward creating a lump on the side of your foot. Imbalance is the result of foot structure and instability.   Bunions do not improve with time. They usually enlarge, however this is a fairly slow process. Shoes do not necessarily cause bunions, however, they can hasten development and definitely cause bunions to hurt.   Bunions often run in families. We inherit a certain foot structure, which may be predisposed to bunion development.   Bunion pain is likely a combination of shoes rubbing on the bump, nerve irritation, compression between the toes, joint misalignment, arthritis and altered gait.   SYMPTOMS   Bunions are usually termed mild, moderate or severe. Just because you have a bunion does not mean you have to have pain. There are some people with very severe bunions and no pain and people with mild bunions and a lot of pain.   - Pain on the inside of your foot at the big toe joint (1st MTPJ)   - Swelling on the inside of your foot at the big toe joint   - Redness on the inside of your foot at the big toe joint   - Numbness or burning in the big toe (hallux)   - Decreased motion at the big toe joint   - Painful bursa (fluid-filled sac) on the inside of your foot at the big toe joint   - Pain while wearing shoes -especially shoes too narrow or with high heels    - Pain during activities   - Corn in between the big toe and second toe   - Callous formation on the side or bottom of the big toe or big toe joint   - Callous under the second toe joint (2nd MTPJ)   - Pain in the second toe  joint   TREATMENT  Conservative (non-surgical) treatment will not make the bunion go away, but it will hopefully decrease the signs and symptoms you have and help you get rid of the pain and get you back to your activities.   1.  Wider shoes or extra depth shoes: Most bunion pain can be improved simply by wearing compatible shoes. People with bunions cannot choose footwear simply because they like the style. Your bunion should determine which shoes are to be worn. Wide shoes with nonirritating seams,soft leather and a square toe box are most compatible with a bunion. Shoes should fit appropriately right out of the box but may need to be professionally stretched and modified to accommodate the bump. Heels, dress shoes and shoes with pointed toes will not be comfortable.   2. NSAIDs   3.  Arch supports, custom inserts, padding, splints, toe spacers : Most bunion pain can be improved simply by wearing compatible shoes. People with bunions cannot choose footwear simply because they like the style. Your bunion should determine which shoes are to be worn. Wide shoes with nonirritating seams,soft leather and a square toe box are most compatible with a bunion. Shoes should fit appropriately right out of the box but may need to be professionally stretched and modified to accommodate the bump. Heels, dress shoes and shoes with pointed toes will not be comfortable.   4.  Change activities   5.  Physical therapy  SURGERY  Surgical treatment for bunions is sometimes needed. If you are limited by pain, cannot fit in shoes comfortably and are not able to do your daily activities then surgery may be a good option for you. There are many different surgical procedures to repair bunions. Your foot and ankle surgeon will review your foot exam findings, your x-rays, your age, your health, your lifestyle, your physical activity level and discuss with you which procedure he or she would recommend. Surgical procedures for bunions range  from soft tissue repair to cutting and realigning the bones. It is not recommended that you have bunion surgery for cosmetic reasons (you do not like how your foot looks) or because you want to fit in a certain pair of shoes; There is the risk that even after surgery, the bunion will reoccur 9-10% of the time.   Bunion surgery involves cutting and repositioning the bones surrounding the bunion. Pins and screws are used to hold the bones in place during the healing process. The goal of bunion surgery is to reduce the size of the bunion bump. Realignment of the toe and joint is attempted.     Some first toes cannot be forced back into normal alignment even with surgery. Surgery is helpful in most cases but does not necessarily create a normal foot.   Healing after surgery requires about six weeks of protection. This allows the bone to heal. Maximum recovery takes about one year. The scar tissue and jOint structures require this amount of time to finish the healing process. Expect stiffness, swelling and numbness during that time frame. Bunion surgery does involve side effects. Some side effects are predictable and others are less common but do occur. A scar will be visible and could be irritated by shoes. The shoe may rub on the screw or internal pin requiring surgical removal of these fixation devices. The screw and pin would likely be left in place for a full year. The first toe may loose motion after bunion surgery. The amount of stiffness is variable. Some people never regain normal motion of the first toe. This is due to scar tissue inherent to any surgery. The first toe may drift toward the second toe or away from the second toe. Spreading of the first and second toes is a rare occurrence after bunion surgery. This can be quite bothersome and would need to be surgically repaired. Toe drift toward the second toe could result in a recurrent bunion and revision surgery. Joint fusion is one option to correct an  unstable, drifting toe. This procedure straightens the toe, however, no motion remains. Fusion may be necessary to correct complications of bunion surgery or as the original procedure in severe cases.   All surgical procedures involve risk of infection, numbness, pain, delayed healing, osteotomy dislocation, blood clots, continued foot pain, etc. Bunion surgery is quite complex and should not be taken lightly.   Any skin incision can lead to infection. Deep infection might involve the bone and thus repeat surgery and six weeks of IV antibiotics. Scar tissue can cause nerve pain or numbness. This is generally temporary but can be permanent. We do not have treatments that cure nerve problems. Second toe pain could be related to altered mechanics and pressure transferred to the second toe. Most feet with bunions have pre-existing second toe problems. Delayed bone healing would lengthen the healing time. Some bones simply do not heal. This requires repeat surgery, electronic bone stimulation and/or extended protection. Smokers have an approximate 20% chance of poor bone healing. This is double that of a non-smoker. The bone cut may displace. This may need to be repaired with a second operation. Displacement can cause jOint malalignment. Immobility after surgery can cause blood clots in the legs and lungs. This could result in death.   Foot pain is complex. Most feet hurt for more than one reason. Fixing the bunion would not necessarily create a pain free foot. Appropriate shoes, healthy body weight, avoidance of bare foot walking and moderation of activity will always be necessary to enjoy foot comfort. Your bunion may involve arthritis, which is incurable even with surgery. Long standing bunions often involve chronic irritation to the surrounding nerves. Nerve pain may not resolve even with reducing the bunion bump since permanent nerve damage may be present   Bunion surgery is nevertheless quite successful. Most  surgical patients are pleased with their foot following bunion surgery. Many of the issues described above can be controlled by taking proper care of your foot during the healing process.   Your surgeon would be happy to fully describe any of the above issues. You should pursue a full understanding of the operation,recovery process and any potential problems that could develop.   PREVENTION  1.  Do not wear high heels if there is a family history of bunions.  2.  Wear shoes that have enough width and depth in the toe box  Here are exercises that may benefit people with bunions:   Toe stretches - Stretching out your toes can help keep them limber and offset foot pain. To stretch your toes, point your toes straight ahead for 5 seconds and then curl them under for 5 seconds. Repeat these stretches 10 times. These exercises can be especially beneficial if you also have hammertoes, or chronically bent toes, in addition to a bunion.   Toe flexing and german - Press your toes against a hard surface such as a wall, to flex and stretch them; hold the position for 10 seconds and repeat three to four times. Then flex your toes in the opposite direction; hold the position for 10 seconds and repeat three to four times.   Stretching your big toe - Using your fingers to gently pull your big toe over into proper alignment can be helpful as well. Hold your toe in position for 10 seconds and repeat three to four times.   Resistance exercises - Wrap either a towel or belt around your big toe and use it to pull your big toe toward you while simultaneously pushing forward, against the towel, with your big toe.   Ball roll - To massage the bottom of your foot, sit down, place a golf ball on the floor under your foot, and roll it around under your foot for two minutes. This can help relieve foot strain and cramping.   Towel curls - You can strengthen your toes by spreading out a small towel on the floor, curling your toes around it,  and pulling it toward you. Repeat five times. Gripping objects with your toes like this can help keep your foot flexible.   Picking up marbles - Another gripping exercise you can perform to keep your foot flexible is picking up marbles with your toes. Do this by placing 20 marbles on the floor in front of you and use your foot to pick the marbles up one by one and place them in a bowl.   Walking along the beach - Whenever possible, spend time walking on sand. This can give you a gentle foot massage and also help strengthen your toes. This is especially beneficial for people who have arthritis associated with their bunions.   1

## 2020-04-24 RX ORDER — TERBINAFINE HYDROCHLORIDE 250 MG/1
250 TABLET ORAL DAILY
Qty: 90 TABLET | Refills: 0 | Status: SHIPPED | OUTPATIENT
Start: 2020-04-24 | End: 2020-07-23

## 2020-07-15 ENCOUNTER — MYC MEDICAL ADVICE (OUTPATIENT)
Dept: FAMILY MEDICINE | Facility: CLINIC | Age: 25
End: 2020-07-15

## 2020-11-17 ENCOUNTER — MYC MEDICAL ADVICE (OUTPATIENT)
Dept: FAMILY MEDICINE | Facility: CLINIC | Age: 25
End: 2020-11-17

## 2020-11-17 NOTE — TELEPHONE ENCOUNTER
Routed message back to patient to clarify/elaborate on symptoms.    Also routed to Dr. Foy to review and await patient clarification, perhaps an office visit?    Dorita Tinsley RN  [facility]

## 2020-11-18 ENCOUNTER — OFFICE VISIT (OUTPATIENT)
Dept: FAMILY MEDICINE | Facility: CLINIC | Age: 25
End: 2020-11-18
Payer: COMMERCIAL

## 2020-11-18 ENCOUNTER — ANCILLARY PROCEDURE (OUTPATIENT)
Dept: GENERAL RADIOLOGY | Facility: CLINIC | Age: 25
End: 2020-11-18
Attending: FAMILY MEDICINE
Payer: COMMERCIAL

## 2020-11-18 VITALS
WEIGHT: 236 LBS | OXYGEN SATURATION: 99 % | TEMPERATURE: 98.5 F | BODY MASS INDEX: 33.04 KG/M2 | HEIGHT: 71 IN | DIASTOLIC BLOOD PRESSURE: 79 MMHG | SYSTOLIC BLOOD PRESSURE: 129 MMHG | HEART RATE: 88 BPM

## 2020-11-18 DIAGNOSIS — R07.89 ATYPICAL CHEST PAIN: Primary | ICD-10-CM

## 2020-11-18 PROBLEM — E66.811 OBESITY (BMI 30.0-34.9): Status: ACTIVE | Noted: 2020-11-18

## 2020-11-18 PROCEDURE — 99214 OFFICE O/P EST MOD 30 MIN: CPT | Performed by: FAMILY MEDICINE

## 2020-11-18 PROCEDURE — 71046 X-RAY EXAM CHEST 2 VIEWS: CPT | Performed by: RADIOLOGY

## 2020-11-18 RX ORDER — ACETAMINOPHEN 500 MG
500-1000 TABLET ORAL EVERY 6 HOURS PRN
COMMUNITY
End: 2021-09-15

## 2020-11-18 ASSESSMENT — MIFFLIN-ST. JEOR: SCORE: 2065.49

## 2020-11-18 ASSESSMENT — PAIN SCALES - GENERAL: PAINLEVEL: EXTREME PAIN (9)

## 2020-11-18 NOTE — PROGRESS NOTES
Subjective     Asher Lynn is a 25 year old male who presents to clinic today for the following health issues:    HPI         Chest Pain  Onset/Duration: 3 weeks on and off, acted up again this morning and on his way here. Before today it has been 3 days since he had symptoms  Description:   Location: started on the upper right side of chest, moved to the middle of upper chest and now on the left side.  Character: sharp, heavy and can be dull. Right now the pain in on right side of upper chest, deep.  Radiation: No, does tend to give him headaches.  Duration: can last for a minute to a few hours. There was a night it kept him up all night and couldn't get comfortable.   Intensity: moderate to severe  Progression of Symptoms: same and intermittent  Accompanying Signs & Symptoms:  Shortness of breath: no  Sweating: no  Nausea/vomiting: no  Lightheadedness: YES  Palpitations: no  Fever/Chills: no  Cough: no           Heartburn: Unsure  History:   Family history of heart disease: no  Tobacco use: no  Previous similar symptoms: no   Precipitating factors:   Worse with exertion: no  Worse with deep breaths: no           Related to eating: no           Better with burping: no  Alleviating factors: None  Therapies tried and outcome: Tylenol, Ibuprofen, drinking water, laying down.     He has been having some deep intermittent chest pains for the last few weeks as noted above.  The pains move around.  They are usually localized at any given time.  They are generally not pleuritic or associated with food or drink intake.  They are not necessarily positional.  They will come on randomly and then just go away.  He has had previous surgery for a left sternoclavicular dislocation back in 2016.  He has had right shoulder surgery in 2017.  He is not having any unusual shortness of breath or URI symptoms.  He does see a chiropractor periodically for treatment of ongoing back issues.  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    Patient Active Problem List   Diagnosis     Shoulder impingement - right     Glenoid labrum tear - right     Nephrolithiasis     Splenic cyst     Current Outpatient Medications   Medication     acetaminophen (TYLENOL) 500 MG tablet     ibuprofen (ADVIL/MOTRIN) 200 MG tablet     No current facility-administered medications for this visit.          Review of Systems   Constitutional, HEENT, cardiovascular, pulmonary, gi and gu systems are negative, except as otherwise noted.      Objective    /79 (BP Location: Right arm, Patient Position: Sitting, Cuff Size: Adult Large)   Pulse 88   Temp 98.5  F (36.9  C) (Oral)   Ht 1.8 m (5' 10.87\")   Wt 107 kg (236 lb)   SpO2 99%   BMI 33.04 kg/m    Body mass index is 33.04 kg/m .  Physical Exam   GENERAL: alert, no distress and over weight  RESP: lungs clear to auscultation - no rales, rhonchi or wheezes  CV: regular rate and rhythm, normal S1 S2, no S3 or S4, no murmur, click or rub  MS: Anterior chest is nontender to palpation    Chest x-ray today shows no apparent abnormalities by my reading        Assessment & Plan       ICD-10-CM    1. Atypical chest pain  R07.89 XR Chest 2 Views     His oxygen saturation and other vital signs and exam are reassuring, along with the chest x-ray  He likely has a deep musculoskeletal source for his chest discomfort, but it is not likely to be cardiopulmonary in etiology  I reassured him about that  We discussed symptomatic treatment and expectant management  He could work with his chiropractor on this if desired as well     BMI:   Estimated body mass index is 33.04 kg/m  as calculated from the following:    Height as of this encounter: 1.8 m (5' 10.87\").    Weight as of this encounter: 107 kg (236 lb).   Weight management plan: Discussed healthy diet and exercise guidelines       Return for a recheck if symptoms worsen " or fail to improve.    Duane Foy MD  [facility]

## 2021-07-23 ENCOUNTER — TRANSFERRED RECORDS (OUTPATIENT)
Dept: HEALTH INFORMATION MANAGEMENT | Facility: CLINIC | Age: 26
End: 2021-07-23

## 2021-08-12 ENCOUNTER — ALLIED HEALTH/NURSE VISIT (OUTPATIENT)
Dept: PEDIATRICS | Facility: CLINIC | Age: 26
End: 2021-08-12
Payer: COMMERCIAL

## 2021-08-12 VITALS — DIASTOLIC BLOOD PRESSURE: 76 MMHG | HEART RATE: 66 BPM | SYSTOLIC BLOOD PRESSURE: 122 MMHG

## 2021-08-12 DIAGNOSIS — Z13.6 SCREENING FOR HYPERTENSION: Primary | ICD-10-CM

## 2021-08-12 PROCEDURE — 99207 PR NO CHARGE NURSE ONLY: CPT

## 2021-08-12 NOTE — PROGRESS NOTES
Asher Lynn is a 25 year old patient who comes in today for a Blood Pressure check.  Initial BP:  /76   Pulse 66      Data Unavailable  Disposition: results routed to provider

## 2021-09-07 ENCOUNTER — TRANSFERRED RECORDS (OUTPATIENT)
Dept: HEALTH INFORMATION MANAGEMENT | Facility: CLINIC | Age: 26
End: 2021-09-07

## 2021-09-13 ENCOUNTER — MYC MEDICAL ADVICE (OUTPATIENT)
Dept: PEDIATRICS | Facility: CLINIC | Age: 26
End: 2021-09-13

## 2021-09-13 ENCOUNTER — MYC MEDICAL ADVICE (OUTPATIENT)
Dept: FAMILY MEDICINE | Facility: CLINIC | Age: 26
End: 2021-09-13

## 2021-09-14 NOTE — TELEPHONE ENCOUNTER
It looks like patient is scheduled with another provider for preop. I did send a message back to him through my chart though.  Katelynn Alexander CMA

## 2021-09-15 ENCOUNTER — OFFICE VISIT (OUTPATIENT)
Dept: PEDIATRICS | Facility: CLINIC | Age: 26
End: 2021-09-15
Payer: COMMERCIAL

## 2021-09-15 VITALS
DIASTOLIC BLOOD PRESSURE: 78 MMHG | WEIGHT: 235.1 LBS | SYSTOLIC BLOOD PRESSURE: 110 MMHG | HEIGHT: 72 IN | TEMPERATURE: 98.3 F | OXYGEN SATURATION: 98 % | HEART RATE: 78 BPM | BODY MASS INDEX: 31.84 KG/M2

## 2021-09-15 DIAGNOSIS — M20.12 HALLUX VALGUS, ACQUIRED, LEFT: ICD-10-CM

## 2021-09-15 DIAGNOSIS — Z01.818 PREOP GENERAL PHYSICAL EXAM: Primary | ICD-10-CM

## 2021-09-15 DIAGNOSIS — Z23 NEED FOR PROPHYLACTIC VACCINATION AND INOCULATION AGAINST INFLUENZA: ICD-10-CM

## 2021-09-15 PROCEDURE — 99214 OFFICE O/P EST MOD 30 MIN: CPT | Mod: GC | Performed by: STUDENT IN AN ORGANIZED HEALTH CARE EDUCATION/TRAINING PROGRAM

## 2021-09-15 PROCEDURE — 90471 IMMUNIZATION ADMIN: CPT | Performed by: STUDENT IN AN ORGANIZED HEALTH CARE EDUCATION/TRAINING PROGRAM

## 2021-09-15 PROCEDURE — 90686 IIV4 VACC NO PRSV 0.5 ML IM: CPT | Performed by: STUDENT IN AN ORGANIZED HEALTH CARE EDUCATION/TRAINING PROGRAM

## 2021-09-15 ASSESSMENT — MIFFLIN-ST. JEOR: SCORE: 2074.41

## 2021-09-15 NOTE — PATIENT INSTRUCTIONS

## 2021-09-15 NOTE — PROGRESS NOTES
[facility]  [address]  [address]  [place] [address]  Phone: [phone]  Fax: [phone]  Primary Provider: Duane Foy  Pre-op Performing Provider: JARRET NICHOLS      PREOPERATIVE EVALUATION:  Today's date: 9/15/2021    Asher Lynn is a 26 year old male who presents for a preoperative evaluation.    Surgical Information:  Surgery/Procedure: Left foot bunion repair  Surgery Location: [facility]: [facility]   Surgeon: Dr. James   Surgery Date: 9/17/21  Time of Surgery: 7 am   Where patient plans to recover: At home with family  Fax number for surgical facility: [phone]    Type of Anesthesia Anticipated: General    Assessment & Plan     The proposed surgical procedure is considered LOW risk.    Preop general physical exam  Hallux valgus, acquired, left  Cleared to proceed with surgery as planned.    Need for prophylactic vaccination and inoculation against influenza  - INFLUENZA VACCINE IM > 6 MONTHS VALENT IIV4 (AFLURIA/FLUZONE)    Medication Instructions:  Patient is on no chronic medications    RECOMMENDATION:  APPROVAL GIVEN to proceed with proposed procedure, without further diagnostic evaluation.    Subjective     HPI related to upcoming procedure: Acquired hallux valgus from old football injury    Preop Questions 9/14/2021   1. Have you ever had a heart attack or stroke? No   2. Have you ever had surgery on your heart or blood vessels, such as a stent placement, a coronary artery bypass, or surgery on an artery in your head, neck, heart, or legs? No   3. Do you have chest pain with activity? No   4. Do you have a history of  heart failure? No   5. Do you currently have a cold, bronchitis or symptoms of other infection? No   6. Do you have a cough, shortness of breath, or wheezing? No   7. Do you or anyone in your family have previous history of blood clots? No   8. Do you or does anyone in your family have a serious bleeding problem such as  prolonged bleeding following surgeries or cuts? No   9. Have you ever had problems with anemia or been told to take iron pills? No   10. Have you had any abnormal blood loss such as black, tarry or bloody stools? No   11. Have you ever had a blood transfusion? No   12. Are you willing to have a blood transfusion if it is medically needed before, during, or after your surgery? Yes   13. Have you or any of your relatives ever had problems with anesthesia? No   14. Do you have sleep apnea, excessive snoring or daytime drowsiness? No   15. Do you have any artifical heart valves or other implanted medical devices like a pacemaker, defibrillator, or continuous glucose monitor? No   16. Do you have artificial joints? No   17. Are you allergic to latex? YES: cutaneous reaction a long time ago. No respiratory symptoms.       Health Care Directive:  Patient does not have a Health Care Directive or Living Will: Discussed advance care planning with patient; however, patient declined at this time.    Preoperative Review of :   reviewed - no record of controlled substances prescribed.      Review of Systems  CONSTITUTIONAL: NEGATIVE for fever, chills, change in weight  INTEGUMENTARY/SKIN: NEGATIVE for worrisome rashes, moles or lesions  EYES: NEGATIVE for vision changes or irritation  ENT/MOUTH: NEGATIVE for ear, mouth and throat problems  RESP: NEGATIVE for significant cough or SOB  CV: NEGATIVE for chest pain, palpitations or peripheral edema  GI: NEGATIVE for nausea, abdominal pain, heartburn, or change in bowel habits  : NEGATIVE for frequency, dysuria, or hematuria  MUSCULOSKELETAL: NEGATIVE for significant arthralgias or myalgia  NEURO: NEGATIVE for weakness, dizziness or paresthesias  ENDOCRINE: NEGATIVE for temperature intolerance, skin/hair changes  HEME: NEGATIVE for bleeding problems  PSYCHIATRIC: NEGATIVE for changes in mood or affect    Patient Active Problem List    Diagnosis Date Noted     Obesity (BMI  30.0-34.9) 11/18/2020     Priority: Medium     Splenic cyst 03/03/2016     Priority: Medium     Nephrolithiasis      Priority: Medium     Shoulder impingement - right 06/14/2013     Priority: Medium     Glenoid labrum tear - right 06/14/2013     Priority: Medium      Past Medical History:   Diagnosis Date     Acne      Nephrolithiasis 12/15    Calcium oxalate stones     Past Surgical History:   Procedure Laterality Date     Arthroscopic right shoulder labral repair  10/2017    Dr. Narayan     ENDOSCOPY  03/20/2015     IR URETERAL STENT PLACEMENT LEFT  02/29/2016    at [facility]     ORIF left hand fracture  03/19/2010    4th metacarpal fracture     repair of left sternoclavicular dislocation  08/2016     repair of right great toe torn ligament Right 01/10/2014    Dr. James     SURGICAL HISTORY OF -   05/26/2010    removal of hardware from 4th metacarpal fx     No current outpatient medications on file.       Allergies   Allergen Reactions     Latex Anaphylaxis and Hives     With a balloon     Morphine Hives        Social History     Tobacco Use     Smoking status: Never Smoker     Smokeless tobacco: Never Used   Substance Use Topics     Alcohol use: Yes     Alcohol/week: 0.0 standard drinks     History   Drug Use No         Objective     /78   Pulse 78   Temp 98.3  F (36.8  C) (Tympanic)   Ht 1.829 m (6')   Wt 106.6 kg (235 lb 1.6 oz)   SpO2 98%   BMI 31.89 kg/m      Physical Exam    GENERAL APPEARANCE: healthy, alert and no distress     EYES: EOMI,  PERRL     HENT: ears externally normal, nose and mouth without ulcers or lesions     NECK: no adenopathy, no asymmetry, masses, or scars and thyroid normal to palpation     RESP: lungs clear to auscultation - no rales, rhonchi or wheezes     CV: regular rates and rhythm, normal S1 S2, no S3 or S4 and no murmur, click or rub     ABDOMEN:  soft, nontender, no HSM or masses and bowel sounds normal     MS: extremities normal- no gross deformities noted, no  evidence of inflammation in joints     SKIN: no suspicious lesions or rashes     NEURO: Normal strength and tone, sensory exam grossly normal, mentation intact and speech normal     PSYCH: mentation appears normal. and affect normal/bright     LYMPHATICS: No cervical adenopathy    Diagnostics:  No labs were ordered during this visit.      No EKG required, no history of coronary heart disease, significant arrhythmia, peripheral arterial disease or other structural heart disease.    Revised Cardiac Risk Index (RCRI):  The patient has the following serious cardiovascular risks for perioperative complications:   - No serious cardiac risks = 0 points     RCRI Interpretation: 0 points: Class I (very low risk - 0.4% complication rate)     Signed Electronically by: Nitesh Dial MD  Copy of this evaluation report is provided to requesting physician.

## 2021-09-28 ENCOUNTER — TRANSFERRED RECORDS (OUTPATIENT)
Dept: HEALTH INFORMATION MANAGEMENT | Facility: CLINIC | Age: 26
End: 2021-09-28

## 2021-10-28 ENCOUNTER — TRANSFERRED RECORDS (OUTPATIENT)
Dept: HEALTH INFORMATION MANAGEMENT | Facility: CLINIC | Age: 26
End: 2021-10-28
Payer: COMMERCIAL

## 2021-11-16 ENCOUNTER — OFFICE VISIT (OUTPATIENT)
Dept: PEDIATRICS | Facility: CLINIC | Age: 26
End: 2021-11-16
Payer: COMMERCIAL

## 2021-11-16 VITALS
HEART RATE: 73 BPM | TEMPERATURE: 97.9 F | BODY MASS INDEX: 31.02 KG/M2 | OXYGEN SATURATION: 98 % | HEIGHT: 72 IN | SYSTOLIC BLOOD PRESSURE: 131 MMHG | WEIGHT: 229 LBS | DIASTOLIC BLOOD PRESSURE: 75 MMHG

## 2021-11-16 DIAGNOSIS — Z23 NEED FOR PROPHYLACTIC VACCINATION AND INOCULATION AGAINST INFLUENZA: ICD-10-CM

## 2021-11-16 DIAGNOSIS — Z00.00 ROUTINE GENERAL MEDICAL EXAMINATION AT A HEALTH CARE FACILITY: Primary | ICD-10-CM

## 2021-11-16 LAB — HBA1C MFR BLD: 5.1 % (ref 0–5.6)

## 2021-11-16 PROCEDURE — 0064A COVID-19,PF,MODERNA (18+ YRS BOOSTER .25ML): CPT | Performed by: INTERNAL MEDICINE

## 2021-11-16 PROCEDURE — 80053 COMPREHEN METABOLIC PANEL: CPT | Performed by: INTERNAL MEDICINE

## 2021-11-16 PROCEDURE — 83036 HEMOGLOBIN GLYCOSYLATED A1C: CPT | Performed by: INTERNAL MEDICINE

## 2021-11-16 PROCEDURE — 91306 COVID-19,PF,MODERNA (18+ YRS BOOSTER .25ML): CPT | Performed by: INTERNAL MEDICINE

## 2021-11-16 PROCEDURE — 80061 LIPID PANEL: CPT | Performed by: INTERNAL MEDICINE

## 2021-11-16 PROCEDURE — 99395 PREV VISIT EST AGE 18-39: CPT | Mod: 25 | Performed by: INTERNAL MEDICINE

## 2021-11-16 PROCEDURE — 36415 COLL VENOUS BLD VENIPUNCTURE: CPT | Performed by: INTERNAL MEDICINE

## 2021-11-16 SDOH — ECONOMIC STABILITY: TRANSPORTATION INSECURITY
IN THE PAST 12 MONTHS, HAS LACK OF TRANSPORTATION KEPT YOU FROM MEETINGS, WORK, OR FROM GETTING THINGS NEEDED FOR DAILY LIVING?: NO

## 2021-11-16 SDOH — HEALTH STABILITY: PHYSICAL HEALTH: ON AVERAGE, HOW MANY DAYS PER WEEK DO YOU ENGAGE IN MODERATE TO STRENUOUS EXERCISE (LIKE A BRISK WALK)?: 5 DAYS

## 2021-11-16 SDOH — ECONOMIC STABILITY: INCOME INSECURITY: IN THE LAST 12 MONTHS, WAS THERE A TIME WHEN YOU WERE NOT ABLE TO PAY THE MORTGAGE OR RENT ON TIME?: NO

## 2021-11-16 SDOH — ECONOMIC STABILITY: INCOME INSECURITY: HOW HARD IS IT FOR YOU TO PAY FOR THE VERY BASICS LIKE FOOD, HOUSING, MEDICAL CARE, AND HEATING?: SOMEWHAT HARD

## 2021-11-16 SDOH — ECONOMIC STABILITY: TRANSPORTATION INSECURITY
IN THE PAST 12 MONTHS, HAS THE LACK OF TRANSPORTATION KEPT YOU FROM MEDICAL APPOINTMENTS OR FROM GETTING MEDICATIONS?: NO

## 2021-11-16 SDOH — ECONOMIC STABILITY: FOOD INSECURITY: WITHIN THE PAST 12 MONTHS, THE FOOD YOU BOUGHT JUST DIDN'T LAST AND YOU DIDN'T HAVE MONEY TO GET MORE.: SOMETIMES TRUE

## 2021-11-16 SDOH — HEALTH STABILITY: PHYSICAL HEALTH: ON AVERAGE, HOW MANY MINUTES DO YOU ENGAGE IN EXERCISE AT THIS LEVEL?: 30 MIN

## 2021-11-16 SDOH — ECONOMIC STABILITY: FOOD INSECURITY: WITHIN THE PAST 12 MONTHS, YOU WORRIED THAT YOUR FOOD WOULD RUN OUT BEFORE YOU GOT MONEY TO BUY MORE.: SOMETIMES TRUE

## 2021-11-16 ASSESSMENT — SOCIAL DETERMINANTS OF HEALTH (SDOH)
DO YOU BELONG TO ANY CLUBS OR ORGANIZATIONS SUCH AS CHURCH GROUPS UNIONS, FRATERNAL OR ATHLETIC GROUPS, OR SCHOOL GROUPS?: YES
IN A TYPICAL WEEK, HOW MANY TIMES DO YOU TALK ON THE PHONE WITH FAMILY, FRIENDS, OR NEIGHBORS?: TWICE A WEEK
HOW OFTEN DO YOU ATTEND CHURCH OR RELIGIOUS SERVICES?: NEVER
HOW OFTEN DO YOU GET TOGETHER WITH FRIENDS OR RELATIVES?: ONCE A WEEK

## 2021-11-16 ASSESSMENT — LIFESTYLE VARIABLES
HOW OFTEN DO YOU HAVE SIX OR MORE DRINKS ON ONE OCCASION: NEVER
HOW OFTEN DO YOU HAVE A DRINK CONTAINING ALCOHOL: 2-3 TIMES A WEEK
HOW MANY STANDARD DRINKS CONTAINING ALCOHOL DO YOU HAVE ON A TYPICAL DAY: 1 OR 2

## 2021-11-16 ASSESSMENT — ENCOUNTER SYMPTOMS
ABDOMINAL PAIN: 0
NAUSEA: 0
JOINT SWELLING: 0
SHORTNESS OF BREATH: 0
DIZZINESS: 0
HEADACHES: 0
CONSTIPATION: 0
MYALGIAS: 0
PALPITATIONS: 0
SORE THROAT: 0
EYE PAIN: 0
PARESTHESIAS: 0
CHILLS: 0
FREQUENCY: 0
DYSURIA: 0
DIARRHEA: 0
HEARTBURN: 0
ARTHRALGIAS: 0
FEVER: 0
NERVOUS/ANXIOUS: 0
HEMATOCHEZIA: 0
WEAKNESS: 0
COUGH: 0
HEMATURIA: 0

## 2021-11-16 ASSESSMENT — MIFFLIN-ST. JEOR: SCORE: 2046.74

## 2021-11-16 NOTE — PROGRESS NOTES
SUBJECTIVE:   CC: Asher Lynn is an 26 year old male who presents for preventative health visit.       Patient has been advised of split billing requirements and indicates understanding: Yes  Healthy Habits:     Getting at least 3 servings of Calcium per day:  NO    Bi-annual eye exam:  NO    Dental care twice a year:  Yes    Sleep apnea or symptoms of sleep apnea:  None    Diet:  Regular (no restrictions)    Frequency of exercise:  1 day/week    Duration of exercise:  15-30 minutes    Taking medications regularly:  Yes    Medication side effects:  Not applicable    PHQ-2 Total Score: 1    Additional concerns today:  No    Here for complete physcial exam.     Had toe surgery; boot has been off for 3 weeks. Corrected for the most part.         Today's PHQ-2 Score:   PHQ-2 ( 1999 Pf[employer]) 11/16/2021   Q1: Little interest or pleasure in doing things 1   Q2: Feeling down, depressed or hopeless 0   PHQ-2 Score 1   Q1: Little interest or pleasure in doing things Several days   Q2: Feeling down, depressed or hopeless Not at all   PHQ-2 Score 1       Abuse: Current or Past(Physical, Sexual or Emotional)- No  Do you feel safe in your environment? Yes    Have you ever done Advance Care Planning? (For example, a Health Directive, POLST, or a discussion with a medical provider or your loved ones about your wishes): No, advance care planning information given to patient to review.  Advanced care planning was discussed at today's visit.    Social History     Tobacco Use     Smoking status: Never Smoker     Smokeless tobacco: Never Used   Substance Use Topics     Alcohol use: Yes     Alcohol/week: 0.0 standard drinks     If you drink alcohol do you typically have >3 drinks per day or >7 drinks per week? No    Alcohol Use 11/16/2021   Prescreen: >3 drinks/day or >7 drinks/week? No   Prescreen: >3 drinks/day or >7 drinks/week? -       Last PSA: No results found for: PSA    Reviewed orders with patient. Reviewed health  maintenance and updated orders accordingly - Yes  Lab work is in process  Labs reviewed in EPIC  BP Readings from Last 3 Encounters:   11/16/21 131/75   09/15/21 110/78   08/12/21 122/76    Wt Readings from Last 3 Encounters:   11/16/21 103.9 kg (229 lb)   09/15/21 106.6 kg (235 lb 1.6 oz)   11/18/20 107 kg (236 lb)                  Patient Active Problem List   Diagnosis     Nephrolithiasis     Splenic cyst     Obesity (BMI 30.0-34.9)     Past Surgical History:   Procedure Laterality Date     Arthroscopic right shoulder labral repair  10/2017    Dr. Narayan     ENDOSCOPY  03/20/2015     IR URETERAL STENT PLACEMENT LEFT  02/29/2016    at [facility]     ORIF left hand fracture  03/19/2010    4th metacarpal fracture     repair of left sternoclavicular dislocation  08/2016     repair of right great toe torn ligament Right 01/10/2014    Dr. James     SURGICAL HISTORY OF -   05/26/2010    removal of hardware from 4th metacarpal fx       Social History     Tobacco Use     Smoking status: Never Smoker     Smokeless tobacco: Never Used   Substance Use Topics     Alcohol use: Yes     Alcohol/week: 0.0 standard drinks     Family History   Problem Relation Age of Onset     Hypertension Mother      Glaucoma No family hx of      Macular Degeneration No family hx of      Cancer No family hx of      Cerebrovascular Disease No family hx of      Thyroid Disease No family hx of          No current outpatient medications on file.     Allergies   Allergen Reactions     Latex Anaphylaxis and Hives     With a balloon     Morphine Hives       Reviewed and updated as needed this visit by clinical staff  Tobacco  Allergies  Meds   Med Hx           Reviewed and updated as needed this visit by Provider                 Past Medical History:   Diagnosis Date     Acne      Nephrolithiasis 12/15    Calcium oxalate stones      Past Surgical History:   Procedure Laterality Date     Arthroscopic right shoulder labral repair  10/2017      Sylvain     ENDOSCOPY  03/20/2015     IR URETERAL STENT PLACEMENT LEFT  02/29/2016    at [facility]     ORIF left hand fracture  03/19/2010    4th metacarpal fracture     repair of left sternoclavicular dislocation  08/2016     repair of right great toe torn ligament Right 01/10/2014    Dr. James     SURGICAL HISTORY OF -   05/26/2010    removal of hardware from 4th metacarpal fx       Review of Systems   Constitutional: Negative for chills and fever.   HENT: Negative for congestion, ear pain, hearing loss and sore throat.    Eyes: Negative for pain and visual disturbance.   Respiratory: Negative for cough and shortness of breath.    Cardiovascular: Negative for chest pain, palpitations and peripheral edema.   Gastrointestinal: Negative for abdominal pain, constipation, diarrhea, heartburn, hematochezia and nausea.   Genitourinary: Negative for dysuria, frequency, genital sores, hematuria, impotence, penile discharge and urgency.   Musculoskeletal: Negative for arthralgias, joint swelling and myalgias.   Skin: Negative for rash.   Neurological: Negative for dizziness, weakness, headaches and paresthesias.   Psychiatric/Behavioral: Negative for mood changes. The patient is not nervous/anxious.      CONSTITUTIONAL: NEGATIVE for fever, chills, change in weight  INTEGUMENTARY/SKIN: NEGATIVE for worrisome rashes, moles or lesions  EYES: NEGATIVE for vision changes or irritation  ENT: NEGATIVE for ear, mouth and throat problems  RESP: NEGATIVE for significant cough or SOB  CV: NEGATIVE for chest pain, palpitations or peripheral edema  GI: NEGATIVE for nausea, abdominal pain, heartburn, or change in bowel habits   male: negative for dysuria, hematuria, decreased urinary stream, erectile dysfunction, urethral discharge  MUSCULOSKELETAL: NEGATIVE for significant arthralgias or myalgia  NEURO: NEGATIVE for weakness, dizziness or paresthesias  PSYCHIATRIC: NEGATIVE for changes in mood or affect    OBJECTIVE:   /75    Pulse 73   Temp 97.9  F (36.6  C) (Tympanic)   Ht 1.829 m (6')   Wt 103.9 kg (229 lb)   SpO2 98%   BMI 31.06 kg/m      Physical Exam  GENERAL: healthy, alert and no distress  EYES: Eyes grossly normal to inspection, PERRL and conjunctivae and sclerae normal  HENT: ear canals and TM's normal, nose and mouth without ulcers or lesions  NECK: no adenopathy, no asymmetry, masses, or scars and thyroid normal to palpation  RESP: lungs clear to auscultation - no rales, rhonchi or wheezes  CV: regular rate and rhythm, normal S1 S2, no S3 or S4, no murmur, click or rub, no peripheral edema and peripheral pulses strong  ABDOMEN: soft, nontender, no hepatosplenomegaly, no masses and bowel sounds normal  MS: no gross musculoskeletal defects noted, no edema  SKIN: no suspicious lesions or rashes  NEURO: Normal strength and tone, mentation intact and speech normal  PSYCH: mentation appears normal, affect normal/bright    Diagnostic Test Results:  Labs reviewed in Epic    ASSESSMENT/PLAN:   (Z00.00) Routine general medical examination at a health care facility  (primary encounter diagnosis)  Comment:   Plan: COVID-19,PF,MODERNA (18+ YRS BOOSTER .25ML),         Lipid panel reflex to direct LDL Fasting,         Comprehensive metabolic panel (BMP + Alb, Alk         Phos, ALT, AST, Total. Bili, TP), Hemoglobin         A1c            (Z23) Need for prophylactic vaccination and inoculation against influenza  Comment:   Plan: CANCELED: INFLUENZA VACCINE IM > 6 MONTHS         VALENT IIV4 (AFLURIA/FLUZONE)              Patient has been advised of split billing requirements and indicates understanding: Yes  COUNSELING:   Reviewed preventive health counseling, as reflected in patient instructions       Regular exercise       Healthy diet/nutrition       Vision screening       Hearing screening       Colon cancer screening       Prostate cancer screening    Estimated body mass index is 31.06 kg/m  as calculated from the following:     Height as of this encounter: 1.829 m (6').    Weight as of this encounter: 103.9 kg (229 lb).     Weight management plan: Patient was referred to their PCP to discuss a diet and exercise plan.    He reports that he has never smoked. He has never used smokeless tobacco.      Counseling Resources:  ATP IV Guidelines  Pooled Cohorts Equation Calculator  FRAX Risk Assessment  ICSI Preventive Guidelines  Dietary Guidelines for Americans, 2010  USDA's MyPlate  ASA Prophylaxis  Lung CA Screening    Duane Emmanuel MD  [facility]

## 2021-11-16 NOTE — PATIENT INSTRUCTIONS
Lab work today:  We can do labs in the exam room today.    COVID moderna booster today.        Preventive Health Recommendations  Male Ages 26 - 39    Yearly exam:             See your health care provider every year in order to  o   Review health changes.   o   Discuss preventive care.    o   Review your medicines if your doctor has prescribed any.    You should be tested each year for STDs (sexually transmitted diseases), if you re at risk.     After age 35, talk to your provider about cholesterol testing. If you are at risk for heart disease, have your cholesterol tested at least every 5 years.     If you are at risk for diabetes, you should have a diabetes test (fasting glucose).  Shots: Get a flu shot each year. Get a tetanus shot every 10 years.     Nutrition:    Eat at least 5 servings of fruits and vegetables daily.     Eat whole-grain bread, whole-wheat pasta and brown rice instead of white grains and rice.     Get adequate Calcium and Vitamin D.     Lifestyle    Exercise for at least 150 minutes a week (30 minutes a day, 5 days a week). This will help you control your weight and prevent disease.     Limit alcohol to one drink per day.     No smoking.     Wear sunscreen to prevent skin cancer.     See your dentist every six months for an exam and cleaning.

## 2021-11-17 LAB
ALBUMIN SERPL-MCNC: 4 G/DL (ref 3.4–5)
ALP SERPL-CCNC: 58 U/L (ref 40–150)
ALT SERPL W P-5'-P-CCNC: 42 U/L (ref 0–70)
ANION GAP SERPL CALCULATED.3IONS-SCNC: 5 MMOL/L (ref 3–14)
AST SERPL W P-5'-P-CCNC: 15 U/L (ref 0–45)
BILIRUB SERPL-MCNC: 0.6 MG/DL (ref 0.2–1.3)
BUN SERPL-MCNC: 18 MG/DL (ref 7–30)
CALCIUM SERPL-MCNC: 9.1 MG/DL (ref 8.5–10.1)
CHLORIDE BLD-SCNC: 106 MMOL/L (ref 94–109)
CHOLEST SERPL-MCNC: 156 MG/DL
CO2 SERPL-SCNC: 28 MMOL/L (ref 20–32)
CREAT SERPL-MCNC: 1.07 MG/DL (ref 0.66–1.25)
FASTING STATUS PATIENT QL REPORTED: ABNORMAL
GFR SERPL CREATININE-BSD FRML MDRD: >90 ML/MIN/1.73M2
GLUCOSE BLD-MCNC: 83 MG/DL (ref 70–99)
HDLC SERPL-MCNC: 34 MG/DL
LDLC SERPL CALC-MCNC: 65 MG/DL
NONHDLC SERPL-MCNC: 122 MG/DL
POTASSIUM BLD-SCNC: 3.9 MMOL/L (ref 3.4–5.3)
PROT SERPL-MCNC: 7.6 G/DL (ref 6.8–8.8)
SODIUM SERPL-SCNC: 139 MMOL/L (ref 133–144)
TRIGL SERPL-MCNC: 285 MG/DL

## 2021-12-09 ENCOUNTER — TRANSFERRED RECORDS (OUTPATIENT)
Dept: HEALTH INFORMATION MANAGEMENT | Facility: CLINIC | Age: 26
End: 2021-12-09
Payer: COMMERCIAL

## 2021-12-20 ENCOUNTER — E-VISIT (OUTPATIENT)
Dept: FAMILY MEDICINE | Facility: CLINIC | Age: 26
End: 2021-12-20
Payer: COMMERCIAL

## 2021-12-20 DIAGNOSIS — Z20.822 SUSPECTED COVID-19 VIRUS INFECTION: ICD-10-CM

## 2021-12-20 DIAGNOSIS — J02.9 SORE THROAT: ICD-10-CM

## 2021-12-20 PROCEDURE — 99421 OL DIG E/M SVC 5-10 MIN: CPT | Performed by: PHYSICIAN ASSISTANT

## 2022-10-12 ENCOUNTER — LAB (OUTPATIENT)
Dept: FAMILY MEDICINE | Facility: CLINIC | Age: 27
End: 2022-10-12
Payer: COMMERCIAL

## 2022-10-12 DIAGNOSIS — Z20.822 SUSPECTED COVID-19 VIRUS INFECTION: ICD-10-CM

## 2022-10-12 LAB — SARS-COV-2 RNA RESP QL NAA+PROBE: NEGATIVE

## 2022-10-12 PROCEDURE — U0005 INFEC AGEN DETEC AMPLI PROBE: HCPCS

## 2022-10-12 PROCEDURE — U0003 INFECTIOUS AGENT DETECTION BY NUCLEIC ACID (DNA OR RNA); SEVERE ACUTE RESPIRATORY SYNDROME CORONAVIRUS 2 (SARS-COV-2) (CORONAVIRUS DISEASE [COVID-19]), AMPLIFIED PROBE TECHNIQUE, MAKING USE OF HIGH THROUGHPUT TECHNOLOGIES AS DESCRIBED BY CMS-2020-01-R: HCPCS

## 2022-12-30 ENCOUNTER — NURSE TRIAGE (OUTPATIENT)
Dept: PEDIATRICS | Facility: CLINIC | Age: 27
End: 2022-12-30

## 2022-12-30 ENCOUNTER — MYC MEDICAL ADVICE (OUTPATIENT)
Dept: PEDIATRICS | Facility: CLINIC | Age: 27
End: 2022-12-30

## 2022-12-30 NOTE — TELEPHONE ENCOUNTER
"Reports having SOB when up walking around AND COUGHING at the same time.     Reason for Disposition    [1] COVID-19 diagnosed by positive lab test (e.g., PCR, rapid self-test kit) AND [2] mild symptoms (e.g., cough, fever, others) AND [3] no complications or SOB    Answer Assessment - Initial Assessment Questions  1. COVID-19 DIAGNOSIS: \"Who made your COVID-19 diagnosis?\" \"Was it confirmed by a positive lab test or self-test?\" If not diagnosed by a doctor (or NP/PA), ask \"Are there lots of cases (community spread) where you live?\" Note: See public health department website, if unsure.      Home tests done today, positive today  2. COVID-19 EXPOSURE: \"Was there any known exposure to COVID before the symptoms began?\" [facility] Definition of close contact: within 6 feet (2 meters) for a total of 15 minutes or more over a 24-hour period.       No  3. ONSET: \"When did the COVID-19 symptoms start?\"       Cough started last week on Friday, tested Thursday/Friday/Sat/Sun all negative. Monday was getting worse- cough was scratchy/more dry. Worst day yesterday.   4. WORST SYMPTOM: \"What is your worst symptom?\" (e.g., cough, fever, shortness of breath, muscle aches)      Little congestion but not significant  5. COUGH: \"Do you have a cough?\" If Yes, ask: \"How bad is the cough?\"        Yes, jsut as bad as yesterday- coughing spells last a little long and little more painful in his chest today.   6. FEVER: \"Do you have a fever?\" If Yes, ask: \"What is your temperature, how was it measured, and when did it start?\"      No  7. RESPIRATORY STATUS: \"Describe your breathing?\" (e.g., shortness of breath, wheezing, unable to speak)       A little SOB when up and moving when he coughs  8. BETTER-SAME-WORSE: \"Are you getting better, staying the same or getting worse compared to yesterday?\"  If getting worse, ask, \"In what way?\"      same  9. HIGH RISK DISEASE: \"Do you have any chronic medical problems?\" (e.g., asthma, heart or lung disease, " "weak immune system, obesity, etc.)      Sports induced asthma  10. VACCINE: \"Have you had the COVID-19 vaccine?\" If Yes, ask: \"Which one, how many shots, when did you get it?\"        Yes, 3 of 4  11. BOOSTER: \"Have you received your COVID-19 booster?\" If Yes, ask: \"Which one and when did you get it?\"        Not the bivalent  12. PREGNANCY: \"Is there any chance you are pregnant?\" \"When was your last menstrual period?\"        NA  13. OTHER SYMPTOMS: \"Do you have any other symptoms?\"  (e.g., chills, fatigue, headache, loss of smell or taste, muscle pain, sore throat)        Nothing more than usual  14. O2 SATURATION MONITOR:  \"Do you use an oxygen saturation monitor (pulse oximeter) at home?\" If Yes, ask \"What is your reading (oxygen level) today?\" \"What is your usual oxygen saturation reading?\" (e.g., 95%)        No    Protocols used: CORONAVIRUS (COVID-19) DIAGNOSED OR [id number] 1.18.2022      RN COVID TREATMENT VISIT  12/30/22    Asher John Shane  27 year old  Current weight? 235.0    Has the patient been seen by a primary care provider at an [facility] or [facility] within the past two years? Yes.   Have you been in close proximity to/do you have a known exposure to a person with a confirmed case of influenza? No.     Date of positive COVID test (PCR or at home)?  12/30/2022    Current COVID symptoms: cough    Date COVID symptoms began: 12/22/2022    Do you have any of the following conditions that place you at risk of being very sick from COVID-19? overweight (BMI>25)    Is patient eligible to continue? No, patient has COVID symptoms started more than 5 days ago and informed he/she does not qualify for treatment.       Barbie Alvarado RN              "

## 2023-07-26 NOTE — TELEPHONE ENCOUNTER
Routing to PCP to review and advise.    Lottie Ron RN  [facility]         Detail Level: Detailed

## 2024-01-22 ENCOUNTER — OFFICE VISIT (OUTPATIENT)
Dept: FAMILY MEDICINE | Facility: CLINIC | Age: 29
End: 2024-01-22
Payer: COMMERCIAL

## 2024-01-22 VITALS
RESPIRATION RATE: 14 BRPM | DIASTOLIC BLOOD PRESSURE: 80 MMHG | TEMPERATURE: 98.9 F | SYSTOLIC BLOOD PRESSURE: 130 MMHG | HEART RATE: 106 BPM | BODY MASS INDEX: 31.03 KG/M2 | HEIGHT: 72 IN | OXYGEN SATURATION: 99 % | WEIGHT: 229.13 LBS

## 2024-01-22 DIAGNOSIS — J02.0 STREP PHARYNGITIS: ICD-10-CM

## 2024-01-22 DIAGNOSIS — J02.9 SORE THROAT: Primary | ICD-10-CM

## 2024-01-22 DIAGNOSIS — R59.1 LYMPHADENOPATHY: ICD-10-CM

## 2024-01-22 LAB
DEPRECATED S PYO AG THROAT QL EIA: POSITIVE
FLUAV AG SPEC QL IA: NEGATIVE
FLUBV AG SPEC QL IA: NEGATIVE

## 2024-01-22 PROCEDURE — 87804 INFLUENZA ASSAY W/OPTIC: CPT | Performed by: NURSE PRACTITIONER

## 2024-01-22 PROCEDURE — 87880 STREP A ASSAY W/OPTIC: CPT | Performed by: NURSE PRACTITIONER

## 2024-01-22 PROCEDURE — 99213 OFFICE O/P EST LOW 20 MIN: CPT | Performed by: NURSE PRACTITIONER

## 2024-01-22 RX ORDER — PENICILLIN V POTASSIUM 500 MG/1
500 TABLET, FILM COATED ORAL 2 TIMES DAILY
Qty: 20 TABLET | Refills: 0 | Status: SHIPPED | OUTPATIENT
Start: 2024-01-22 | End: 2024-02-01

## 2024-01-22 ASSESSMENT — ENCOUNTER SYMPTOMS: SORE THROAT: 1

## 2024-01-22 NOTE — PROGRESS NOTES
Assessment & Plan     Sore throat  Strep positive  Flu negative    - Streptococcus A Rapid Screen w/Reflex to PCR - Clinic Collect  - Influenza A/B antigen    Strep pharyngitis  Treating for strep.  New toothbrush after 24 hours  Out of work 12 hours.  Follow up if worsening or not improving over the next 48-72 hours.     - penicillin V (VEETID) 500 MG tablet; Take 1 tablet (500 mg) by mouth 2 times daily for 10 days    Lymphadenopathy  I think mass on occiput is likely swollen lymph node related to illness. If not improving over the next 2-4 weeks patient should be seen again to address further.            BMI  Estimated body mass index is 31.07 kg/m  as calculated from the following:    Height as of this encounter: 1.829 m (6').    Weight as of this encounter: 103.9 kg (229 lb 2 oz).           oNemi Guajardo is a 28 year old, presenting for the following health issues:  Pharyngitis, lots of pressure  (Around head /Coivd test was negative one on Saturday and yesterday ), and Dizziness (Have here and there )      1/22/2024    10:50 AM   Additional Questions   Roomed by MADIHA KAMINSKI     Pharyngitis      Small lump on back of neck.   Sore muscles-body aches  Two negative covid tests at home  Had fever-chills at night. Has not taken temp.  Symptoms since Saturday.    Has been taking tylenol and ibuprofen for symptoms.                  Objective    /80 (BP Location: Right arm, Patient Position: Sitting, Cuff Size: Adult Regular)   Pulse 106   Temp 98.9  F (37.2  C) (Oral)   Resp 14   Ht 1.829 m (6')   Wt 103.9 kg (229 lb 2 oz)   SpO2 99%   BMI 31.07 kg/m    Body mass index is 31.07 kg/m .  Physical Exam  Vitals reviewed.   HENT:      Right Ear: Hearing, tympanic membrane, ear canal and external ear normal.      Left Ear: Hearing, tympanic membrane, ear canal and external ear normal.      Nose: Nose normal.      Mouth/Throat:      Mouth: Mucous membranes are moist.      Pharynx: Oropharynx is clear. Posterior  oropharyngeal erythema present.      Tonsils: Tonsillar exudate present. 2+ on the right. 2+ on the left.   Cardiovascular:      Rate and Rhythm: Normal rate and regular rhythm.   Pulmonary:      Effort: Pulmonary effort is normal.      Breath sounds: Normal breath sounds.   Lymphadenopathy:      Head:      Right side of head: Occipital adenopathy present.      Cervical: Cervical adenopathy present.   Neurological:      General: No focal deficit present.      Mental Status: He is alert and oriented to person, place, and time.                    Signed Electronically by: THEE PETERS CNP

## 2024-04-10 SDOH — HEALTH STABILITY: PHYSICAL HEALTH: ON AVERAGE, HOW MANY MINUTES DO YOU ENGAGE IN EXERCISE AT THIS LEVEL?: 20 MIN

## 2024-04-10 SDOH — HEALTH STABILITY: PHYSICAL HEALTH: ON AVERAGE, HOW MANY DAYS PER WEEK DO YOU ENGAGE IN MODERATE TO STRENUOUS EXERCISE (LIKE A BRISK WALK)?: 3 DAYS

## 2024-04-10 ASSESSMENT — SOCIAL DETERMINANTS OF HEALTH (SDOH): HOW OFTEN DO YOU GET TOGETHER WITH FRIENDS OR RELATIVES?: ONCE A WEEK

## 2024-04-11 ENCOUNTER — OFFICE VISIT (OUTPATIENT)
Dept: PEDIATRICS | Facility: CLINIC | Age: 29
End: 2024-04-11
Payer: COMMERCIAL

## 2024-04-11 VITALS
SYSTOLIC BLOOD PRESSURE: 121 MMHG | OXYGEN SATURATION: 98 % | RESPIRATION RATE: 16 BRPM | HEIGHT: 71 IN | HEART RATE: 68 BPM | BODY MASS INDEX: 32.48 KG/M2 | WEIGHT: 232 LBS | TEMPERATURE: 98.2 F | DIASTOLIC BLOOD PRESSURE: 79 MMHG

## 2024-04-11 DIAGNOSIS — Z11.59 NEED FOR HEPATITIS C SCREENING TEST: ICD-10-CM

## 2024-04-11 DIAGNOSIS — G44.209 TENSION HEADACHE: ICD-10-CM

## 2024-04-11 DIAGNOSIS — E66.811 OBESITY (BMI 30.0-34.9): ICD-10-CM

## 2024-04-11 DIAGNOSIS — Z00.00 ROUTINE GENERAL MEDICAL EXAMINATION AT A HEALTH CARE FACILITY: Primary | ICD-10-CM

## 2024-04-11 PROBLEM — S43.431D LABRAL TEAR OF SHOULDER, RIGHT, SUBSEQUENT ENCOUNTER: Status: ACTIVE | Noted: 2024-04-11

## 2024-04-11 LAB — HBA1C MFR BLD: 5.4 % (ref 0–5.6)

## 2024-04-11 PROCEDURE — 80061 LIPID PANEL: CPT | Performed by: INTERNAL MEDICINE

## 2024-04-11 PROCEDURE — 99395 PREV VISIT EST AGE 18-39: CPT | Performed by: INTERNAL MEDICINE

## 2024-04-11 PROCEDURE — 36415 COLL VENOUS BLD VENIPUNCTURE: CPT | Performed by: INTERNAL MEDICINE

## 2024-04-11 PROCEDURE — 80053 COMPREHEN METABOLIC PANEL: CPT | Performed by: INTERNAL MEDICINE

## 2024-04-11 PROCEDURE — 83036 HEMOGLOBIN GLYCOSYLATED A1C: CPT | Performed by: INTERNAL MEDICINE

## 2024-04-11 RX ORDER — CYCLOBENZAPRINE HCL 10 MG
TABLET ORAL
COMMUNITY
End: 2024-04-11

## 2024-04-11 RX ORDER — NAPROXEN 500 MG/1
TABLET ORAL
COMMUNITY
End: 2024-04-11

## 2024-04-11 ASSESSMENT — PAIN SCALES - GENERAL: PAINLEVEL: NO PAIN (0)

## 2024-04-11 NOTE — PROGRESS NOTES
"Preventive Care Visit  [facility] CORIE Emmanuel MD, Internal Medicine - Pediatrics  Apr 11, 2024      Assessment & Plan     Routine general medical examination at a health care facility  Discussed diet, exercise, testicular self exam, blood pressure, cholesterol, and need for cancer surveillance at appropriate ages.   - Hemoglobin A1c; Future  - Comprehensive metabolic panel (BMP + Alb, Alk Phos, ALT, AST, Total. Bili, TP); Future  - Lipid panel reflex to direct LDL Fasting; Future    Obesity (BMI 30.0-34.9)  Discussed 10 pound weight loss.     Tension headache  Discussed sleep, diet, exercise.  As needed excedrin.  the book \"Full Catastrophe Living\", by Lior Valdivia, and begin the course described therein.    Gastroesophageal reflux disease:  Avoid alcohol, caffeine, tobacco, spicy foods, citrus foods, aspirin and anti-inflammatories like ibuprofen (\"Advil\" and \"Motrin\") or naproxen (\"Aleve\"). Trial of prilosec (omeprazole).               BMI  Estimated body mass index is 32.36 kg/m  as calculated from the following:    Height as of this encounter: 1.803 m (5' 11\").    Weight as of this encounter: 105.2 kg (232 lb).   Weight management plan: Discussed healthy diet and exercise guidelines    Counseling  Appropriate preventive services were discussed with this patient, including applicable screening as appropriate for fall prevention, nutrition, physical activity, Tobacco-use cessation, weight loss and cognition.  Checklist reviewing preventive services available has been given to the patient.  Reviewed patient's diet, addressing concerns and/or questions.   He is at risk for lack of exercise and has been provided with information to increase physical activity for the benefit of his well-being.   He is at risk for psychosocial distress and has been provided with information to reduce risk.           Noemi Guajardo is a 28 year old, presenting for the following:  Physical     "       HPI  Some gastroesophageal reflux disease symptoms lately, about every other night, later at night; sitting upright helps.  Pain central chest.  Usually at 9 PM. No significant alcohol at night.      Headaches are about 2-3 per week, with one migraine per week. Vision affected, can last 2 days.  Excedrin works.    Exercises 2-3 days per week; light weights and cardio.    Sleeping well.  Gets 7 hours per night.               4/10/2024   General Health   How would you rate your overall physical health? Good   Feel stress (tense, anxious, or unable to sleep) Only a little   (!) STRESS CONCERN      4/10/2024   Nutrition   Three or more servings of calcium each day? Yes   Diet: Breakfast skipped    I don't know   How many servings of fruit and vegetables per day? (!) 2-3   How many sweetened beverages each day? 0-1         4/10/2024   Exercise   Days per week of moderate/strenous exercise 3 days   Average minutes spent exercising at this level 20 min         4/10/2024   Social Factors   Frequency of gathering with friends or relatives Once a week   Worry food won't last until get money to buy more No   Food not last or not have enough money for food? No   Do you have housing?  Yes   Are you worried about losing your housing? No   Lack of transportation? No   Unable to get utilities (heat,electricity)? No         4/10/2024   Dental   Dentist two times every year? Yes         4/10/2024   TB Screening   Were you born outside of the [place]? No         Today's PHQ-2 Score:       4/10/2024     1:56 PM   PHQ-2 ( 1999 [employer])   Q1: Little interest or pleasure in doing things 1   Q2: Feeling down, depressed or hopeless 1   PHQ-2 Score 2   Q1: Little interest or pleasure in doing things Several days   Q2: Feeling down, depressed or hopeless Several days   PHQ-2 Score 2           4/10/2024   Substance Use   Alcohol more than 3/day or more than 7/wk No   Do you use any other substances recreationally? (!) CANNABIS PRODUCTS      Social History     Tobacco Use    Smoking status: Never    Smokeless tobacco: Never   Substance Use Topics    Alcohol use: Yes    Drug use: No           4/10/2024   STI Screening   New sexual partner(s) since last STI/HIV test? No         4/10/2024   Contraception/Family Planning   Questions about contraception or family planning No        Reviewed and updated as needed this visit by Provider                    Past Medical History:   Diagnosis Date    Acne     Nephrolithiasis 12/01/2015    Calcium oxalate stones     Past Surgical History:   Procedure Laterality Date    Arthroscopic right shoulder labral repair  10/2017    Dr. Narayan    ENDOSCOPY  03/20/2015    IR URETERAL STENT PLACEMENT LEFT  02/29/2016    at [facility]    ORIF left hand fracture  03/19/2010    4th metacarpal fracture    repair of left sternoclavicular dislocation  08/2016    repair of right great toe torn ligament Right 01/10/2014    Dr. James    SURGICAL HISTORY OF -   05/26/2010    removal of hardware from 4th metacarpal fx         Review of Systems  Constitutional, HEENT, cardiovascular, pulmonary, GI, , musculoskeletal, neuro, skin, endocrine and psych systems are negative, except as otherwise noted.     Objective    Exam  There were no vitals taken for this visit.   Estimated body mass index is 31.07 kg/m  as calculated from the following:    Height as of 1/22/24: 1.829 m (6').    Weight as of 1/22/24: 103.9 kg (229 lb 2 oz).    Physical Exam  GENERAL: alert and no distress  EYES: Eyes grossly normal to inspection, PERRL and conjunctivae and sclerae normal  HENT: ear canals and TM's normal, nose and mouth without ulcers or lesions  NECK: no adenopathy, no asymmetry, masses, or scars  RESP: lungs clear to auscultation - no rales, rhonchi or wheezes  CV: regular rate and rhythm, normal S1 S2, no S3 or S4, no murmur, click or rub, no peripheral edema  ABDOMEN: soft, nontender, no hepatosplenomegaly, no masses and bowel sounds  normal  MS: no gross musculoskeletal defects noted, no edema  SKIN: no suspicious lesions or rashes  NEURO: Normal strength and tone, mentation intact and speech normal  PSYCH: mentation appears normal, affect normal/bright        Signed Electronically by: Duane Emmanuel MD

## 2024-04-11 NOTE — PATIENT INSTRUCTIONS
"Avoid alcohol, caffeine, tobacco, spicy foods, citrus foods, aspirin and anti-inflammatories like ibuprofen (\"Advil\" and \"Motrin\") or naproxen (\"Aleve\").     May try prilosec (omeprazole).      Goal weight loss: 10 pounds.     Try to limit complex carbs (rice, bread, pasta, potatoes, cereals) and simple carbs (pop, juice, alcohol, and even milk.)  Eating more lean proteins (chicken, fish, eggs, beans, nuts) and unlimited vegetables and fruits can definitely help as well.     In general, try to spread meals out during the day, eating smaller breakfasts, lunches and dinners--and having healthy snacks in between.  It's a good idea to limit your carbs at mealtimes to 60-75 grams of carbs, and to limit your carbs at snacktimes to 15-30 grams of carbs.  (Check the food labels to add up these carbs.)     Cardio exercise is probably the most helpful thing for your heart and future health.  Try to get about 30 minutes of sustained cardio exercise (biking, running, swimming, fast walking) every other day or even every day.  Keeping your heart rate at a \"target\" of (220 - your age) x 0.80 will be your goal.  For example, if you're 60 years old, this would be (220 - 60) x 0.80 = 128 beats per minute for those 30 minutes of exercise.     With respect to headache; encourage good sleep, diet, exercise, and stress management.   the book \"Full Catastrophe Living\", by Lior Valdivia, and begin the course described therein.   May take as needed excedrin.  If symptoms worsen, we could try daily preventive meds.     Duane Emmanuel MD  Internal Medicine and Pediatrics           Preventive Care Advice   This is general advice given by our system to help you stay healthy. However, your care team may have specific advice just for you. Please talk to your care team about your preventive care needs.  Nutrition  Eat 5 or more servings of fruits and vegetables each day.  Try wheat bread, brown rice and whole grain pasta (instead of white " bread, rice, and pasta).  Get enough calcium and vitamin D. Check the label on foods and aim for 100% of the RDA (recommended daily allowance).  Lifestyle  Exercise at least 150 minutes each week   (30 minutes a day, 5 days a week).  Do muscle strengthening activities 2 days a week. These help control your weight and prevent disease.  No smoking.  Wear sunscreen to prevent skin cancer.  Have a dental exam and cleaning every 6 months.  Yearly exams  See your health care team every year to talk about:  Any changes in your health.  Any medicines your care team has prescribed.  Preventive care, family planning, and ways to prevent chronic diseases.  Shots (vaccines)   HPV shots (up to age 26), if you've never had them before.  Hepatitis B shots (up to age 59), if you've never had them before.  COVID-19 shot: Get this shot when it's due.  Flu shot: Get a flu shot every year.  Tetanus shot: Get a tetanus shot every 10 years.  Pneumococcal, hepatitis A, and RSV shots: Ask your care team if you need these based on your risk.  Shingles shot (for age 50 and up).  General health tests  Diabetes screening:  Starting at age 35, Get screened for diabetes at least every 3 years.  If you are younger than age 35, ask your care team if you should be screened for diabetes.  Cholesterol test: At age 39, start having a cholesterol test every 5 years, or more often if advised.  Bone density scan (DEXA): At age 50, ask your care team if you should have this scan for osteoporosis (brittle bones).  Hepatitis C: Get tested at least once in your life.  STIs (sexually transmitted infections)  Before age 24: Ask your care team if you should be screened for STIs.  After age 24: Get screened for STIs if you're at risk. You are at risk for STIs (including HIV) if:  You are sexually active with more than one person.  You don't use condoms every time.  You or a partner was diagnosed with a sexually transmitted infection.  If you are at risk for HIV,  ask about PrEP medicine to prevent HIV.  Get tested for HIV at least once in your life, whether you are at risk for HIV or not.  Cancer screening tests  Cervical cancer screening: If you have a cervix, begin getting regular cervical cancer screening tests at age 21. Most people who have regular screenings with normal results can stop after age 65. Talk about this with your provider.  Breast cancer scan (mammogram): If you've ever had breasts, begin having regular mammograms starting at age 40. This is a scan to check for breast cancer.  Colon cancer screening: It is important to start screening for colon cancer at age 45.  Have a colonoscopy test every 10 years (or more often if you're at risk) Or, ask your provider about stool tests like a FIT test every year or Cologuard test every 3 years.  To learn more about your testing options, visit: https://[email or web address]/[email or web address].  For help making a decision, visit: https://FarmBot.Tag & [email or web address].  Prostate cancer screening test: If you have a prostate and are age 55 to 69, ask your provider if you would benefit from a yearly prostate cancer screening test.  Lung cancer screening: If you are a current or former smoker age 50 to 80, ask your care team if ongoing lung cancer screenings are right for you.  For informational purposes only. Not to replace the advice of your health care provider. Copyright   2023 [facility]. All rights reserved. Clinically reviewed by the [facility] Transitions Program. SNOBSWAP 599289 - REV 01/24.    Learning About Stress  What is stress?     Stress is your body's response to a hard situation. Your body can have a physical, emotional, or mental response. Stress is a fact of life for most people, and it affects everyone differently. What causes stress for you may not be stressful for someone else.  A lot of things can cause stress. You may feel stress when you go on a job interview, take a test, or run a race. This kind of  short-term stress is normal and even useful. It can help you if you need to work hard or react quickly. For example, stress can help you finish an important job on time.  Long-term stress is caused by ongoing stressful situations or events. Examples of long-term stress include long-term health problems, ongoing problems at work, or conflicts in your family. Long-term stress can harm your health.  How does stress affect your health?  When you are stressed, your body responds as though you are in danger. It makes hormones that speed up your heart, make you breathe faster, and give you a burst of energy. This is called the fight-or-flight stress response. If the stress is over quickly, your body goes back to normal and no harm is done.  But if stress happens too often or lasts too long, it can have bad effects. Long-term stress can make you more likely to get sick, and it can make symptoms of some diseases worse. If you tense up when you are stressed, you may develop neck, shoulder, or low back pain. Stress is linked to high blood pressure and heart disease.  Stress also harms your emotional health. It can make you lal, tense, or depressed. Your relationships may suffer, and you may not do well at work or school.  What can you do to manage stress?  You can try these things to help manage stress:   Do something active. Exercise or activity can help reduce stress. Walking is a great way to get started. Even everyday activities such as housecleaning or yard work can help.  Try yoga or joseph chi. These techniques combine exercise and meditation. You may need some training at first to learn them.  Do something you enjoy. For example, listen to music or go to a movie. Practice your hobby or do volunteer work.  Meditate. This can help you relax, because you are not worrying about what happened before or what may happen in the future.  Do guided imagery. Imagine yourself in any setting that helps you feel calm. You can use  "online videos, books, or a teacher to guide you.  Do breathing exercises. For example:  From a standing position, bend forward from the waist with your knees slightly bent. Let your arms dangle close to the floor.  Breathe in slowly and deeply as you return to a standing position. Roll up slowly and lift your head last.  Hold your breath for just a few seconds in the standing position.  Breathe out slowly and bend forward from the waist.  Let your feelings out. Talk, laugh, cry, and express anger when you need to. Talking with supportive friends or family, a counselor, or a alice leader about your feelings is a healthy way to relieve stress. Avoid discussing your feelings with people who make you feel worse.  Write. It may help to write about things that are bothering you. This helps you find out how much stress you feel and what is causing it. When you know this, you can find better ways to cope.  What can you do to prevent stress?  You might try some of these things to help prevent stress:  Manage your time. This helps you find time to do the things you want and need to do.  Get enough sleep. Your body recovers from the stresses of the day while you are sleeping.  Get support. Your family, friends, and community can make a difference in how you experience stress.  Limit your news feed. Avoid or limit time on social media or news that may make you feel stressed.  Do something active. Exercise or activity can help reduce stress. Walking is a great way to get started.  Where can you learn more?  Go to https://www.EternoGen.net/patiented  Enter N032 in the search box to learn more about \"Learning About Stress.\"  Current as of: October 24, 2023               Content Version: 14.0    2508-0985 Healthwise, Incorporated.   Care instructions adapted under license by your healthcare professional. If you have questions about a medical condition or this instruction, always ask your healthcare professional. [employer] " Incorporated disclaims any warranty or liability for your use of this information.      Substance Use Disorder: Care Instructions  Overview     You can improve your life and health by stopping your use of alcohol or drugs. When you don't drink or use drugs, you may feel and sleep better. You may get along better with your family, friends, and coworkers. There are medicines and programs that can help with substance use disorder.  How can you care for yourself at home?  Here are some ways to help you stay sober and prevent relapse.  If you have been given medicine to help keep you sober or reduce your cravings, be sure to take it exactly as prescribed.  Talk to your doctor about programs that can help you stop using drugs or drinking alcohol.  Do not keep alcohol or drugs in your home.  Plan ahead. Think about what you'll say if other people ask you to drink or use drugs. Try not to spend time with people who drink or use drugs.  Use the time and money spent on drinking or drugs to do something that's important to you.  Preventing a relapse  Have a plan to deal with relapse. Learn to recognize changes in your thinking that lead you to drink or use drugs. Get help before you start to drink or use drugs again.  Try to stay away from situations, friends, or places that may lead you to drink or use drugs.  If you feel the need to drink alcohol or use drugs again, seek help right away. Call a trusted friend or family member. Some people get support from organizations such as Narcotics Anonymous or [employer] or from treatment facilities.  If you relapse, get help as soon as you can. Some people make a plan with another person that outlines what they want that person to do for them if they relapse. The plan usually includes how to handle the relapse and who to notify in case of relapse.  Don't give up. Remember that a relapse doesn't mean that you have failed. Use the experience to learn the triggers that lead you to  "drink or use drugs. Then quit again. Recovery is a lifelong process. Many people have several relapses before they are able to quit for good.  Follow-up care is a key part of your treatment and safety. Be sure to make and go to all appointments, and call your doctor if you are having problems. It's also a good idea to know your test results and keep a list of the medicines you take.  When should you call for help?   Call 911  anytime you think you may need emergency care. For example, call if you or someone else:    Has overdosed or has withdrawal signs. Be sure to tell the emergency workers that you are or someone else is using or trying to quit using drugs. Overdose or withdrawal signs may include:  Losing consciousness.  Seizure.  Seeing or hearing things that aren't there (hallucinations).     Is thinking or talking about suicide or harming others.   Where to get help 24 hours a day, 7 days a week   If you or someone you know talks about suicide, self-harm, a mental health crisis, a substance use crisis, or any other kind of emotional distress, get help right away. You can:    Call the Suicide and Crisis Lifeline at 988.     Call 7-375-662-TALK (1-565.415.6080).     Text HOME to 010429 to access the Crisis Text Line.   Consider saving these numbers in your phone.  Go to [employer].Dermal Life for more information or to chat online.  Call your doctor now or seek immediate medical care if:    You are having withdrawal symptoms. These may include nausea or vomiting, sweating, shakiness, and anxiety.   Watch closely for changes in your health, and be sure to contact your doctor if:    You have a relapse.     You need more help or support to stop.   Where can you learn more?  Go to https://www.healthAdScoot.net/patiented  Enter H573 in the search box to learn more about \"Substance Use Disorder: Care Instructions.\"  Current as of: November 15, 2023               Content Version: 14.0    8954-3539 Healthwise, Incorporated. "   Care instructions adapted under license by your healthcare professional. If you have questions about a medical condition or this instruction, always ask your healthcare professional. Healthwise, Incorporated disclaims any warranty or liability for your use of this information.

## 2024-04-12 LAB
ALBUMIN SERPL BCG-MCNC: 4.6 G/DL (ref 3.5–5.2)
ALP SERPL-CCNC: 63 U/L (ref 40–150)
ALT SERPL W P-5'-P-CCNC: 37 U/L (ref 0–70)
ANION GAP SERPL CALCULATED.3IONS-SCNC: 9 MMOL/L (ref 7–15)
AST SERPL W P-5'-P-CCNC: 22 U/L (ref 0–45)
BILIRUB SERPL-MCNC: 0.5 MG/DL
BUN SERPL-MCNC: 14.6 MG/DL (ref 6–20)
CALCIUM SERPL-MCNC: 9.6 MG/DL (ref 8.6–10)
CHLORIDE SERPL-SCNC: 105 MMOL/L (ref 98–107)
CHOLEST SERPL-MCNC: 140 MG/DL
CREAT SERPL-MCNC: 0.94 MG/DL (ref 0.67–1.17)
DEPRECATED HCO3 PLAS-SCNC: 28 MMOL/L (ref 22–29)
EGFRCR SERPLBLD CKD-EPI 2021: >90 ML/MIN/1.73M2
FASTING STATUS PATIENT QL REPORTED: ABNORMAL
GLUCOSE SERPL-MCNC: 89 MG/DL (ref 70–99)
HDLC SERPL-MCNC: 38 MG/DL
LDLC SERPL CALC-MCNC: 74 MG/DL
NONHDLC SERPL-MCNC: 102 MG/DL
POTASSIUM SERPL-SCNC: 4.8 MMOL/L (ref 3.4–5.3)
PROT SERPL-MCNC: 7.5 G/DL (ref 6.4–8.3)
SODIUM SERPL-SCNC: 142 MMOL/L (ref 135–145)
TRIGL SERPL-MCNC: 141 MG/DL

## 2024-07-31 ENCOUNTER — OFFICE VISIT (OUTPATIENT)
Dept: OPTOMETRY | Facility: CLINIC | Age: 29
End: 2024-07-31
Payer: COMMERCIAL

## 2024-07-31 DIAGNOSIS — H52.13 MYOPIA OF BOTH EYES: ICD-10-CM

## 2024-07-31 DIAGNOSIS — Z01.00 ENCOUNTER FOR EXAMINATION OF EYES AND VISION WITHOUT ABNORMAL FINDINGS: Primary | ICD-10-CM

## 2024-07-31 PROCEDURE — 92015 DETERMINE REFRACTIVE STATE: CPT | Performed by: OPTOMETRIST

## 2024-07-31 PROCEDURE — 92004 COMPRE OPH EXAM NEW PT 1/>: CPT | Performed by: OPTOMETRIST

## 2024-07-31 ASSESSMENT — REFRACTION_MANIFEST
OS_SPHERE: -0.50
OD_SPHERE: -0.75
OD_SPHERE: -0.75
OD_CYLINDER: SPHERE
OS_CYLINDER: SPHERE
METHOD_AUTOREFRACTION: 1
OS_CYLINDER: +0.25
OS_SPHERE: -1.25
OD_CYLINDER: SPHERE
OS_AXIS: 039

## 2024-07-31 ASSESSMENT — SLIT LAMP EXAM - LIDS
COMMENTS: NORMAL
COMMENTS: NORMAL

## 2024-07-31 ASSESSMENT — CONF VISUAL FIELD
OS_INFERIOR_NASAL_RESTRICTION: 0
OS_SUPERIOR_NASAL_RESTRICTION: 0
OS_NORMAL: 1
METHOD: COUNTING FINGERS
OD_INFERIOR_NASAL_RESTRICTION: 0
OS_SUPERIOR_TEMPORAL_RESTRICTION: 0
OD_INFERIOR_TEMPORAL_RESTRICTION: 0
OD_SUPERIOR_TEMPORAL_RESTRICTION: 0
OS_INFERIOR_TEMPORAL_RESTRICTION: 0
OD_NORMAL: 1
OD_SUPERIOR_NASAL_RESTRICTION: 0

## 2024-07-31 ASSESSMENT — KERATOMETRY
OS_AXISANGLE_DEGREES: 090
OS_K1POWER_DIOPTERS: 42.25
OD_K1POWER_DIOPTERS: 42.50
OD_AXISANGLE_DEGREES: 087
OD_AXISANGLE2_DEGREES: 177
OD_K2POWER_DIOPTERS: 43.25
OS_AXISANGLE2_DEGREES: 180
OS_K2POWER_DIOPTERS: 42.75

## 2024-07-31 ASSESSMENT — EXTERNAL EXAM - RIGHT EYE: OD_EXAM: NORMAL

## 2024-07-31 ASSESSMENT — CUP TO DISC RATIO
OS_RATIO: 0.45
OD_RATIO: 0.45

## 2024-07-31 ASSESSMENT — VISUAL ACUITY
METHOD: SNELLEN - LINEAR
OS_SC: 20/20
OD_SC: 20/20
OD_SC: 20/20
OS_SC+: -1
OD_SC+: -1
OS_SC: 20/20

## 2024-07-31 ASSESSMENT — TONOMETRY
OS_IOP_MMHG: 14
IOP_METHOD: APPLANATION
OD_IOP_MMHG: 14

## 2024-07-31 ASSESSMENT — EXTERNAL EXAM - LEFT EYE: OS_EXAM: NORMAL

## 2024-07-31 NOTE — LETTER
7/31/2024      Asher Lynn  [address] [place] [address]      Dear Colleague,    Thank you for referring your patient, Asher Lynn, to the [facility]. Please see a copy of my visit note below.    Chief Complaint   Patient presents with     Annual Eye Exam         Last Eye Exam: 11/11/2015 Dr. Judd  Dilated Previously: Yes, side effects of dilation explained today    What are you currently using to see?  does not use glasses or contacts       Distance Vision Acuity: Satisfied with vision    Near Vision Acuity: Satisfied with vision while reading and using computer unaided    Eye Comfort: good  Do you use eye drops? : No  Occupation or Hobbies: Teacher    Pratima Patel  Optometry Assistant        Medical, surgical and family histories reviewed and updated 7/31/2024.       OBJECTIVE: See Ophthalmology exam    ASSESSMENT:    ICD-10-CM    1. Encounter for examination of eyes and vision without abnormal findings  Z01.00       2. Myopia of both eyes  H52.13           PLAN:     Patient Instructions   A prescription for glasses is not necessary at this time.     Ocular health within normal limits.     Return for comprehensive eye exam in 2 years, or sooner if needed.     The effects of the dilating drops last for 4- 6 hours.  You will be more sensitive to light and vision will be blurry up close.  Mydriatic sunglasses were given if needed.      Kareem Thomas O.D.  [facility]  [address] [place] [address]. [place]  [address]    [phone]        Again, thank you for allowing me to participate in the care of your patient.        Sincerely,        Kareem Thomas OD

## 2024-07-31 NOTE — PROGRESS NOTES
Chief Complaint   Patient presents with    Annual Eye Exam         Last Eye Exam: 11/11/2015 Dr. Judd  Dilated Previously: Yes, side effects of dilation explained today    What are you currently using to see?  does not use glasses or contacts       Distance Vision Acuity: Satisfied with vision    Near Vision Acuity: Satisfied with vision while reading and using computer unaided    Eye Comfort: good  Do you use eye drops? : No  Occupation or Hobbies: Teacher    Pratima Patel  Optometry Assistant        Medical, surgical and family histories reviewed and updated 7/31/2024.       OBJECTIVE: See Ophthalmology exam    ASSESSMENT:    ICD-10-CM    1. Encounter for examination of eyes and vision without abnormal findings  Z01.00       2. Myopia of both eyes  H52.13           PLAN:     Patient Instructions   A prescription for glasses is not necessary at this time.     Ocular health within normal limits.     Return for comprehensive eye exam in 2 years, or sooner if needed.     The effects of the dilating drops last for 4- 6 hours.  You will be more sensitive to light and vision will be blurry up close.  Mydriatic sunglasses were given if needed.      Kareem Thomas O.D.  [address]  [address]    [phone]

## 2024-07-31 NOTE — PATIENT INSTRUCTIONS
A prescription for glasses is not necessary at this time.     Ocular health within normal limits.     Return for comprehensive eye exam in 2 years, or sooner if needed.     The effects of the dilating drops last for 4- 6 hours.  You will be more sensitive to light and vision will be blurry up close.  Mydriatic sunglasses were given if needed.      Kareem Thomas O.D.  [facility] [place]  [address] [place] [address]. [place]  [place]  [address]    [phone]

## 2024-12-09 ENCOUNTER — NURSE TRIAGE (OUTPATIENT)
Dept: PEDIATRICS | Facility: CLINIC | Age: 29
End: 2024-12-09

## 2024-12-09 NOTE — TELEPHONE ENCOUNTER
Nurse Triage SBAR    Is this a 2nd Level Triage? NO    Situation:   Patient calling in for acute abdominal pain.    Background:   Pain started at 6AM after patient woke up. He noted the pain was so severe he was going to the ED, but during his drive, the pain was relieved.    Assessment:   Currently pain is 3/10 (was previously 10/10). Pain is mostly upper right abdomen/upper middle abdomen. Patient reporting he was nauseous, now relieved. Patient reporting loose stool, denies diarrhea. Patient denies fever, chest pain, and shortness of breath.  Protocol Recommended Disposition:   Home Care, See More Appropriate Protocol    Recommendation:   Gave care advice. Originally had scheduled patient to be seen as he was not sure about if he wanted to be seen. Cancelled appointment, instructed patient to call the triage line with any new or worsening symptoms or concerns.    Does the patient meet one of the following criteria for ADS visit consideration? 16+ years old, with an [facility] PCP     TIP  Providers, please consider if this condition is appropriate for management at one of our Acute and Diagnostic Services sites.     If patient is a good candidate, please use dotphrase <dot>triageresponse and select Refer to ADS to document.  Reason for Disposition   Pain is mainly in upper abdomen (if needed ask: 'is it mainly above the belly button?')   Mild abdominal pain    Additional Information   Negative: Passed out (e.g., fainted, lost consciousness, blacked out and was not responding)   Negative: Shock suspected (e.g., cold/pale/clammy skin, too weak to stand, low BP, rapid pulse)   Negative: Sounds like a life-threatening emergency to the triager   Negative: Followed an abdomen (stomach) injury   Negative: Chest pain   Negative: SEVERE difficulty breathing (e.g., struggling for each breath, speaks in single words)   Negative: Shock suspected (e.g., cold/pale/clammy skin, too weak to stand, low BP, rapid pulse)   Negative:  Difficult to awaken or acting confused (e.g., disoriented, slurred speech)   Negative: Passed out (e.g., fainted, lost consciousness, blacked out and was not responding)   Negative: Visible sweat on face or sweat is dripping down   Negative: Sounds like a life-threatening emergency to the triager   Negative: Followed an abdomen (stomach) injury   Negative: Chest pain   Negative: Abdominal pain and pregnant < 20 weeks   Negative: Abdominal pain and pregnant 20 or more weeks   Negative: Abdomen bloating or swelling are main symptoms   Negative: SEVERE abdominal pain (e.g., excruciating)   Negative: Pain lasting > 10 minutes and over 50 years old   Negative: Pain lasting > 10 minutes and over 40 years old and associated chest, arm, neck, upper back, or jaw pain   Negative: Pain lasting > 10 minutes and over 35 years old and at least one cardiac risk factor (e.g., diabetes, high cholesterol, hypertension, obesity, smoker or strong family history of heart disease)   Negative: Pain lasting > 10 minutes and history of heart disease (i.e., heart attack, bypass surgery, angina, angioplasty, CHF)   Negative: Pain lasts > 10 minutes and difficulty breathing   Negative: Vomiting red blood or black (coffee ground) material  (Exception: Few streaks and only occurred once.)   Negative: Blood in bowel movements  (Exception: Blood on surface of BM with constipation.)   Negative: Black or tarry bowel movements  (Exception: Chronic-unchanged black-grey BMs AND is taking iron pills or Pepto-Bismol.)   Negative: Pregnant 20 weeks or more and new hand or face swelling   Negative: MILD TO MODERATE constant pain lasting > 2 hours   Negative: MILD TO MODERATE pain and not relieved by antacid medicine   Negative: Vomiting bile (green color)   Negative: Patient sounds very sick or weak to the triager   Negative: Vomiting and abdomen looks much more swollen than usual   Negative: White of the eyes have turned yellow (i.e., jaundice)    "Negative: Fever > 103 F (39.4 C)   Negative: Fever > 101 F (38.3 C) and over 60 years of age   Negative: Fever > 100 F (37.8 C) and has diabetes mellitus or a weak immune system (e.g., HIV positive, cancer chemotherapy, organ transplant, splenectomy, chronic steroids)   Negative: Fever > 100 F (37.8 C) and bedridden (e.g., CVA, chronic illness, recovering from surgery)   Negative: MODERATE pain that comes and goes (cramps) lasts > 24 hours   Negative: MILD pain that comes and goes (cramps) > 72 hours  (Exception: This same abdominal pain is a chronic symptom recurrent or ongoing AND present > 4 weeks.)   Negative: Unhealthy alcohol use, known or suspected   Negative: Abdominal pain is a chronic symptom (recurrent or ongoing AND lasting > 4 weeks)   Negative: Intermittent pains shoot into chest, with sour taste in mouth (Exception: Symptoms same as previously diagnosed reflux and not tried antacids.)    Answer Assessment - Initial Assessment Questions  1. LOCATION: \"Where does it hurt?\"       Right middle quadrant    2. RADIATION: \"Does the pain shoot anywhere else?\" (e.g., chest, back)      No    3. ONSET: \"When did the pain begin?\" (Minutes, hours or days ago)       Pain started this morning, around 6:00AM    4. SUDDEN: \"Gradual or sudden onset?\"      Sudden    5. PATTERN \"Does the pain come and go, or is it constant?\"      Was constant until 10 minutes ago    6. SEVERITY: \"How bad is the pain?\"  (e.g., Scale 1-10; mild, moderate, or severe)      Currently 3/10, at its worst 10/10    7. RECURRENT SYMPTOM: \"Have you ever had this type of stomach pain before?\" If Yes, ask: \"When was the last time?\" and \"What happened that time?\"       Yea, very similar to kidney stones, but only on 1 side this time. Not exactly like when he had a kidney stone, but similar.    8. CAUSE: \"What do you think is causing the stomach pain?\"      First thought was kidney stone, concerned about appendicitis     9. RELIEVING/AGGRAVATING " "FACTORS: \"What makes it better or worse?\" (e.g., antacids, bending or twisting motion, bowel movement)      Better: Sitting and driving, letting it settle down, took tylenol, not sure if that helped  Worse: Movement    10. OTHER SYMPTOMS: \"Do you have any other symptoms?\" (e.g., back pain, diarrhea, fever, urination pain, vomiting)        Loose stool, nausea    Protocols used: Abdominal Pain - Male-A-OH, Abdominal Pain - Upper-A-OH    "

## 2025-03-12 ENCOUNTER — PATIENT OUTREACH (OUTPATIENT)
Dept: CARE COORDINATION | Facility: CLINIC | Age: 30
End: 2025-03-12
Payer: COMMERCIAL

## 2025-03-26 ENCOUNTER — PATIENT OUTREACH (OUTPATIENT)
Dept: CARE COORDINATION | Facility: CLINIC | Age: 30
End: 2025-03-26
Payer: COMMERCIAL

## 2025-06-20 ENCOUNTER — HOSPITAL ENCOUNTER (EMERGENCY)
Facility: CLINIC | Age: 30
Discharge: LEFT BEFORE DISCHARGE | End: 2025-06-20
Payer: COMMERCIAL

## 2025-06-20 NOTE — ED TRIAGE NOTES
Problem: Pressure Injury - Risk of 
Goal: *Prevention of pressure injury Description Document Vishnu Scale and appropriate interventions in the flowsheet. Outcome: Progressing Towards Goal 
  
Problem: Diabetes Self-Management Goal: *Disease process and treatment process Description Define diabetes and identify own type of diabetes; list 3 options for treating diabetes. Outcome: Progressing Towards Goal 
Goal: *Using medications safely Description State effect of diabetes medications on diabetes; name diabetes medication taking, action and side effects. Outcome: Progressing Towards Goal 
Goal: *Monitoring blood glucose, interpreting and using results Description Identify recommended blood glucose targets  and personal targets.  
Outcome: Progressing Towards Goal 
  
 Pt left without being seen before triage and signed form by IC out front.

## 2025-06-26 ENCOUNTER — OFFICE VISIT (OUTPATIENT)
Dept: PEDIATRICS | Facility: CLINIC | Age: 30
End: 2025-06-26
Attending: INTERNAL MEDICINE
Payer: COMMERCIAL

## 2025-06-26 ENCOUNTER — RESULTS FOLLOW-UP (OUTPATIENT)
Dept: PEDIATRICS | Facility: CLINIC | Age: 30
End: 2025-06-26

## 2025-06-26 VITALS
RESPIRATION RATE: 14 BRPM | SYSTOLIC BLOOD PRESSURE: 129 MMHG | HEIGHT: 72 IN | HEART RATE: 80 BPM | WEIGHT: 233 LBS | OXYGEN SATURATION: 99 % | DIASTOLIC BLOOD PRESSURE: 88 MMHG | BODY MASS INDEX: 31.56 KG/M2 | TEMPERATURE: 97.5 F

## 2025-06-26 DIAGNOSIS — Z00.00 ROUTINE GENERAL MEDICAL EXAMINATION AT A HEALTH CARE FACILITY: Primary | ICD-10-CM

## 2025-06-26 DIAGNOSIS — Z30.2 ENCOUNTER FOR VASECTOMY: ICD-10-CM

## 2025-06-26 DIAGNOSIS — Z11.59 NEED FOR HEPATITIS C SCREENING TEST: ICD-10-CM

## 2025-06-26 LAB
CHOLEST SERPL-MCNC: 159 MG/DL
EST. AVERAGE GLUCOSE BLD GHB EST-MCNC: 97 MG/DL
FASTING STATUS PATIENT QL REPORTED: YES
HBA1C MFR BLD: 5 % (ref 0–5.6)
HDLC SERPL-MCNC: 39 MG/DL
LDLC SERPL CALC-MCNC: 93 MG/DL
NONHDLC SERPL-MCNC: 120 MG/DL
TRIGL SERPL-MCNC: 133 MG/DL

## 2025-06-26 RX ORDER — VIT B COMP NO.3/FOLIC/C/BIOTIN 1 MG-60 MG
1 TABLET ORAL DAILY
COMMUNITY

## 2025-06-26 SDOH — HEALTH STABILITY: PHYSICAL HEALTH: ON AVERAGE, HOW MANY DAYS PER WEEK DO YOU ENGAGE IN MODERATE TO STRENUOUS EXERCISE (LIKE A BRISK WALK)?: 3 DAYS

## 2025-06-26 SDOH — HEALTH STABILITY: PHYSICAL HEALTH: ON AVERAGE, HOW MANY MINUTES DO YOU ENGAGE IN EXERCISE AT THIS LEVEL?: 60 MIN

## 2025-06-26 ASSESSMENT — SOCIAL DETERMINANTS OF HEALTH (SDOH): HOW OFTEN DO YOU GET TOGETHER WITH FRIENDS OR RELATIVES?: ONCE A WEEK

## 2025-06-26 NOTE — PATIENT INSTRUCTIONS
"FLU and COVID this fall.    Lab work today:  We can do labs in the exam room today, or you can get them done downstairs in the lab.      If you are going downstairs:  Directions:  As you walk through the first floor, you'll see (on the right) first the pharmacy, then some bathrooms, then the \"Lab and Imaging\" area. Give them your name at the window there and wait for them to call you.     Age 45:  colonoscopy.    Monitor your back pain symptoms and avoid excess quick stretches over extensions.    Daily physical therapy and range of motion.     Call urology for vasectomy appointment .      Patient Education   Preventive Care Advice   This is general advice given by our system to help you stay healthy. However, your care team may have specific advice just for you. Please talk to your care team about your preventive care needs.  Nutrition  Eat 5 or more servings of fruits and vegetables each day.  Try wheat bread, brown rice and whole grain pasta (instead of white bread, rice, and pasta).  Get enough calcium and vitamin D. Check the label on foods and aim for 100% of the RDA (recommended daily allowance).  Lifestyle  Exercise at least 150 minutes each week  (30 minutes a day, 5 days a week).  Do muscle strengthening activities 2 days a week. These help control your weight and prevent disease.  No smoking.  Wear sunscreen to prevent skin cancer.  Have a dental exam and cleaning every 6 months.  Yearly exams  See your health care team every year to talk about:  Any changes in your health.  Any medicines your care team has prescribed.  Preventive care, family planning, and ways to prevent chronic diseases.  Shots (vaccines)   HPV shots (up to age 26), if you've never had them before.  Hepatitis B shots (up to age 59), if you've never had them before.  COVID-19 shot: Get this shot when it's due.  Flu shot: Get a flu shot every year.  Tetanus shot: Get a tetanus shot every 10 years.  Pneumococcal, hepatitis A, and RSV " shots: Ask your care team if you need these based on your risk.  Shingles shot (for age 50 and up)  General health tests  Diabetes screening:  Starting at age 35, Get screened for diabetes at least every 3 years.  If you are younger than age 35, ask your care team if you should be screened for diabetes.  Cholesterol test: At age 39, start having a cholesterol test every 5 years, or more often if advised.  Bone density scan (DEXA): At age 50, ask your care team if you should have this scan for osteoporosis (brittle bones).  Hepatitis C: Get tested at least once in your life.  STIs (sexually transmitted infections)  Before age 24: Ask your care team if you should be screened for STIs.  After age 24: Get screened for STIs if you're at risk. You are at risk for STIs (including HIV) if:  You are sexually active with more than one person.  You don't use condoms every time.  You or a partner was diagnosed with a sexually transmitted infection.  If you are at risk for HIV, ask about PrEP medicine to prevent HIV.  Get tested for HIV at least once in your life, whether you are at risk for HIV or not.  Cancer screening tests  Cervical cancer screening: If you have a cervix, begin getting regular cervical cancer screening tests starting at age 21.  Breast cancer scan (mammogram): If you've ever had breasts, begin having regular mammograms starting at age 40. This is a scan to check for breast cancer.  Colon cancer screening: It is important to start screening for colon cancer at age 45.  Have a colonoscopy test every 10 years (or more often if you're at risk) Or, ask your provider about stool tests like a FIT test every year or Cologuard test every 3 years.  To learn more about your testing options, visit:   .  For help making a decision, visit:   https://bit.ly/ha96592.  Prostate cancer screening test: If you have a prostate, ask your care team if a prostate cancer screening test (PSA) at age 55 is right for you.  Lung cancer  screening: If you are a current or former smoker ages 50 to 80, ask your care team if ongoing lung cancer screenings are right for you.  For informational purposes only. Not to replace the advice of your health care provider. Copyright   2023 [facility] [employer]. All rights reserved. Clinically reviewed by the [facility]. [employer] 002313 - REV 01/24.  Learning About Stress  What is stress?     Stress is your body's response to a hard situation. Your body can have a physical, emotional, or mental response. Stress is a fact of life for most people, and it affects everyone differently. What causes stress for you may not be stressful for someone else.  A lot of things can cause stress. You may feel stress when you go on a job interview, take a test, or run a race. This kind of short-term stress is normal and even useful. It can help you if you need to work hard or react quickly. For example, stress can help you finish an important job on time.  Long-term stress is caused by ongoing stressful situations or events. Examples of long-term stress include long-term health problems, ongoing problems at work, or conflicts in your family. Long-term stress can harm your health.  How does stress affect your health?  When you are stressed, your body responds as though you are in danger. It makes hormones that speed up your heart, make you breathe faster, and give you a burst of energy. This is called the fight-or-flight stress response. If the stress is over quickly, your body goes back to normal and no harm is done.  But if stress happens too often or lasts too long, it can have bad effects. Long-term stress can make you more likely to get sick, and it can make symptoms of some diseases worse. If you tense up when you are stressed, you may develop neck, shoulder, or low back pain. Stress is linked to high blood pressure and heart disease.  Stress also harms your emotional health. It can make you  lal, tense, or depressed. Your relationships may suffer, and you may not do well at work or school.  What can you do to manage stress?  You can try these things to help manage stress:   Do something active. Exercise or activity can help reduce stress. Walking is a great way to get started. Even everyday activities such as housecleaning or yard work can help.  Try yoga or joseph chi. These techniques combine exercise and meditation. You may need some training at first to learn them.  Do something you enjoy. For example, listen to music or go to a movie. Practice your hobby or do volunteer work.  Meditate. This can help you relax, because you are not worrying about what happened before or what may happen in the future.  Do guided imagery. Imagine yourself in any setting that helps you feel calm. You can use online videos, books, or a teacher to guide you.  Do breathing exercises. For example:  From a standing position, bend forward from the waist with your knees slightly bent. Let your arms dangle close to the floor.  Breathe in slowly and deeply as you return to a standing position. Roll up slowly and lift your head last.  Hold your breath for just a few seconds in the standing position.  Breathe out slowly and bend forward from the waist.  Let your feelings out. Talk, laugh, cry, and express anger when you need to. Talking with supportive friends or family, a counselor, or a alice leader about your feelings is a healthy way to relieve stress. Avoid discussing your feelings with people who make you feel worse.  Write. It may help to write about things that are bothering you. This helps you find out how much stress you feel and what is causing it. When you know this, you can find better ways to cope.  What can you do to prevent stress?  You might try some of these things to help prevent stress:  Manage your time. This helps you find time to do the things you want and need to do.  Get enough sleep. Your body recovers  "from the stresses of the day while you are sleeping.  Get support. Your family, friends, and community can make a difference in how you experience stress.  Limit your news feed. Avoid or limit time on social media or news that may make you feel stressed.  Do something active. Exercise or activity can help reduce stress. Walking is a great way to get started.  Where can you learn more?  Go to https://www.[employer].net/patiented  Enter N032 in the search box to learn more about \"Learning About Stress.\"  Current as of: October 24, 2024  Content Version: 14.5 2024-2025 [employer].   Care instructions adapted under license by your healthcare professional. If you have questions about a medical condition or this instruction, always ask your healthcare professional. [employer] disclaims any warranty or liability for your use of this information.       "

## 2025-06-26 NOTE — PROGRESS NOTES
Preventive Care Visit  [facility] CORIE Emmanuel MD, Internal Medicine - Pediatrics  Jun 26, 2025      Assessment & Plan       Routine general medical examination at a health care facility  Discussed diet, exercise, testicular self exam, blood pressure, cholesterol, and need for cancer surveillance at appropriate ages.    Appropriate preventive services were addressed with this patient via screening, questionnaire, or discussion as appropriate for fall prevention, nutrition, physical activity, Tobacco-use cessation, social engagement, weight loss and cognition.  Checklist reviewing preventive services available has been given to the patient.  Reviewed patient's diet, addressing concerns and/or questions.     - Hemoglobin A1c; Future  - Lipid panel reflex to direct LDL Fasting; Future    Encounter for vasectomy  Needs urology referral  - Adult Urology  Referral; Future    Low back pain and sciatica:  Infrequent symptoms. Epidural steroid injection helped.       Patient has been advised of split billing requirements and indicates understanding: Yes        BMI  Estimated body mass index is 31.6 kg/m  as calculated from the following:    Height as of this encounter: 1.829 m (6').    Weight as of this encounter: 105.7 kg (233 lb).   Weight management plan: Discussed healthy diet and exercise guidelines  Reviewed preventive health counseling, as reflected in patient instructions       Regular exercise       Healthy diet/nutrition       Vision screening       Hearing screening  Counseling  Appropriate preventive services were addressed with this patient via screening, questionnaire, or discussion as appropriate for fall prevention, nutrition, physical activity, Tobacco-use cessation, social engagement, weight loss and cognition.  Checklist reviewing preventive services available has been given to the patient.  Reviewed patient's diet, addressing concerns and/or questions.   He is at risk for  lack of exercise and has been provided with information to increase physical activity for the benefit of his well-being.   He is at risk for psychosocial distress and has been provided with information to reduce risk.         Follow-up    Follow-up Visit   Expected date:  Jun 27, 2025 (Approximate)      Follow Up Appointment Details:     Follow-up with whom?: Other Primary Care Services    Follow-Up for what?: Clinic Staff Visit (MA, LPN, VF) Comment - suture   removal    How?: In Person             Follow-up Visit   Expected date:  Jun 26, 2026 (Approximate)      Follow Up Appointment Details:     Follow-up with whom?: PCP    Follow-Up for what?: Adult Preventive    How?: In Person                 Subjective   Bennett is a 29 year old, presenting for the following:  Physical        6/26/2025     8:43 AM   Additional Questions   Roomed by April Sam   Accompanied by n/a         6/26/2025     8:43 AM   Patient Reported Additional Medications   Patient reports taking the following new medications None          HPI  BPs still borderline.      Weight improved 2 pounds.    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                   Advance Care Planning    Discussed advance care planning with patient; informed AVS has link to [facility] [employer].        6/26/2025   General Health   How would you rate your overall physical health? Good   Feel stress (tense, anxious, or unable to sleep) Rather much   (!) STRESS CONCERN      6/26/2025   Nutrition   Three or more servings of calcium each day? Yes   Diet: Low salt   How many servings of fruit and vegetables per day? (!) 2-3   How many sweetened beverages each day? 0-1         6/26/2025   Exercise   Days per week of moderate/strenous exercise 3 days   Average minutes spent exercising at this level 60 min         6/26/2025   Social Factors   Frequency of gathering with friends or relatives Once a week   Worry food won't last until get money to buy more No   Food not last or not  have enough money for food? No   Do you have housing? (Housing is defined as stable permanent housing and does not include staying outside in a car, in a tent, in an abandoned building, in an overnight shelter, or couch-surfing.) Yes   Are you worried about losing your housing? No   Lack of transportation? No   Unable to get utilities (heat,electricity)? No         6/26/2025   Dental   Dentist two times every year? Yes         Today's PHQ-2 Score:       6/26/2025     7:50 AM   PHQ-2 ( 1999 [employer])   Q1: Little interest or pleasure in doing things 0   Q2: Feeling down, depressed or hopeless 0   PHQ-2 Score 0    Q1: Little interest or pleasure in doing things Not at all   Q2: Feeling down, depressed or hopeless Not at all   PHQ-2 Score 0       Patient-reported           6/26/2025   Substance Use   Alcohol more than 3/day or more than 7/wk No   Do you use any other substances recreationally? No     Social History     Tobacco Use    Smoking status: Never     Passive exposure: Never    Smokeless tobacco: Never   Vaping Use    Vaping status: Never Used   Substance Use Topics    Alcohol use: Yes    Drug use: No           6/26/2025   STI Screening   New sexual partner(s) since last STI/HIV test? No         6/26/2025   Contraception/Family Planning   Questions about contraception or family planning (!) YES         Reviewed and updated as needed this visit by Provider                    Past Medical History:   Diagnosis Date    Acne     Hypertension 2021    Monitoring since    Nephrolithiasis 12/01/2015    Calcium oxalate stones     Past Surgical History:   Procedure Laterality Date    Arthroscopic right shoulder labral repair  10/2017    Dr. Narayan    ENDOSCOPY  03/20/2015    IR URETERAL STENT PLACEMENT LEFT  02/29/2016    at [facility]    ORIF left hand fracture  03/19/2010    4th metacarpal fracture    repair of left sternoclavicular dislocation  08/2016    repair of right great toe torn ligament Right 01/10/2014      [facility]    SURGICAL HISTORY OF -   05/26/2010    removal of hardware from 4th metacarpal fx         Review of Systems  Constitutional, HEENT, cardiovascular, pulmonary, GI, , musculoskeletal, neuro, skin, endocrine and psych systems are negative, except as otherwise noted.     Objective    Exam  /88 (BP Location: Right arm, Patient Position: Sitting, Cuff Size: Adult Large)   Pulse 80   Temp 97.5  F (36.4  C) (Temporal)   Resp 14   Ht 1.829 m (6')   Wt 105.7 kg (233 lb)   SpO2 99%   BMI 31.60 kg/m     Estimated body mass index is 31.6 kg/m  as calculated from the following:    Height as of this encounter: 1.829 m (6').    Weight as of this encounter: 105.7 kg (233 lb).    Physical Exam  GENERAL: alert and no distress  EYES: Eyes grossly normal to inspection, PERRL and conjunctivae and sclerae normal  HENT: ear canals and TM's normal, nose and mouth without ulcers or lesions  NECK: no adenopathy, no asymmetry, masses, or scars  RESP: lungs clear to auscultation - no rales, rhonchi or wheezes  CV: regular rate and rhythm, normal S1 S2, no S3 or S4, no murmur, click or rub, no peripheral edema  ABDOMEN: soft, nontender, no hepatosplenomegaly, no masses and bowel sounds normal   (male): normal male genitalia without lesions or urethral discharge, no hernia  MS: no gross musculoskeletal defects noted, no edema  SKIN: no suspicious lesions or rashes  NEURO: Normal strength and tone, mentation intact and speech normal  PSYCH: mentation appears normal, affect normal/bright        Signed Electronically by: Duane Emmanuel MD

## 2025-06-30 ENCOUNTER — PATIENT OUTREACH (OUTPATIENT)
Dept: CARE COORDINATION | Facility: CLINIC | Age: 30
End: 2025-06-30
Payer: COMMERCIAL